# Patient Record
Sex: FEMALE | Race: BLACK OR AFRICAN AMERICAN | NOT HISPANIC OR LATINO | Employment: FULL TIME | URBAN - METROPOLITAN AREA
[De-identification: names, ages, dates, MRNs, and addresses within clinical notes are randomized per-mention and may not be internally consistent; named-entity substitution may affect disease eponyms.]

---

## 2023-03-15 DIAGNOSIS — E78.5 HYPERLIPIDEMIA, UNSPECIFIED HYPERLIPIDEMIA TYPE: ICD-10-CM

## 2023-03-15 RX ORDER — ROSUVASTATIN CALCIUM 40 MG/1
TABLET, COATED ORAL
Qty: 90 TABLET | Refills: 3 | Status: SHIPPED | OUTPATIENT
Start: 2023-03-15 | End: 2023-03-15

## 2023-04-06 PROBLEM — M25.519 PAIN, JOINT, SHOULDER: Status: ACTIVE | Noted: 2023-04-06

## 2023-04-06 PROBLEM — D21.9 GRANULAR CELL TUMOR: Status: ACTIVE | Noted: 2023-04-06

## 2023-04-06 PROBLEM — R51.9 NEW ONSET HEADACHE: Status: ACTIVE | Noted: 2023-04-06

## 2023-04-06 PROBLEM — E66.9 CLASS 1 OBESITY WITH BODY MASS INDEX (BMI) OF 34.0 TO 34.9 IN ADULT: Status: ACTIVE | Noted: 2023-04-06

## 2023-04-06 PROBLEM — H52.203 MYOPIA OF BOTH EYES WITH ASTIGMATISM AND PRESBYOPIA: Status: ACTIVE | Noted: 2023-04-06

## 2023-04-06 PROBLEM — H01.003 BLEPHARITIS OF BOTH EYES: Status: ACTIVE | Noted: 2023-04-06

## 2023-04-06 PROBLEM — N92.4 PERIMENOPAUSAL MENORRHAGIA: Status: ACTIVE | Noted: 2023-04-06

## 2023-04-06 PROBLEM — R01.1 HEART MURMUR: Status: ACTIVE | Noted: 2023-04-06

## 2023-04-06 PROBLEM — N63.0 LUMP OR MASS IN BREAST: Status: ACTIVE | Noted: 2023-04-06

## 2023-04-06 PROBLEM — N95.0 POSTMENOPAUSAL BLEEDING: Status: ACTIVE | Noted: 2023-04-06

## 2023-04-06 PROBLEM — M25.511 RIGHT SHOULDER PAIN: Status: ACTIVE | Noted: 2023-04-06

## 2023-04-06 PROBLEM — A60.00 PRIMARY GENITAL HERPES SIMPLEX INFECTION: Status: ACTIVE | Noted: 2023-04-06

## 2023-04-06 PROBLEM — R91.1 LUNG NODULE: Status: ACTIVE | Noted: 2023-04-06

## 2023-04-06 PROBLEM — N89.8 VAGINAL DISCHARGE: Status: ACTIVE | Noted: 2023-04-06

## 2023-04-06 PROBLEM — I10 HYPERTENSION: Status: ACTIVE | Noted: 2023-04-06

## 2023-04-06 PROBLEM — R07.9 CHEST PAIN: Status: ACTIVE | Noted: 2023-04-06

## 2023-04-06 PROBLEM — E05.90 SUBCLINICAL HYPERTHYROIDISM: Status: ACTIVE | Noted: 2023-04-06

## 2023-04-06 PROBLEM — C73: Status: ACTIVE | Noted: 2023-04-06

## 2023-04-06 PROBLEM — S39.012A LUMBAR STRAIN: Status: ACTIVE | Noted: 2023-04-06

## 2023-04-06 PROBLEM — R51.9 SINUS HEADACHE: Status: ACTIVE | Noted: 2023-04-06

## 2023-04-06 PROBLEM — N63.20 LEFT BREAST LUMP: Status: ACTIVE | Noted: 2023-04-06

## 2023-04-06 PROBLEM — B37.31 VAGINAL YEAST INFECTION: Status: ACTIVE | Noted: 2023-04-06

## 2023-04-06 PROBLEM — E04.2 NONTOXIC MULTINODULAR GOITER: Status: ACTIVE | Noted: 2023-04-06

## 2023-04-06 PROBLEM — I35.1 MILD AORTIC REGURGITATION: Status: ACTIVE | Noted: 2023-04-06

## 2023-04-06 PROBLEM — D49.1 LUNG TUMOR: Status: ACTIVE | Noted: 2023-04-06

## 2023-04-06 PROBLEM — H52.13 MYOPIA OF BOTH EYES WITH ASTIGMATISM AND PRESBYOPIA: Status: ACTIVE | Noted: 2023-04-06

## 2023-04-06 PROBLEM — M17.12 PRIMARY OSTEOARTHRITIS OF LEFT KNEE: Status: ACTIVE | Noted: 2023-04-06

## 2023-04-06 PROBLEM — E66.811 CLASS 1 OBESITY WITH BODY MASS INDEX (BMI) OF 34.0 TO 34.9 IN ADULT: Status: ACTIVE | Noted: 2023-04-06

## 2023-04-06 PROBLEM — C73 PAPILLARY MICROCARCINOMA OF THYROID (MULTI): Status: ACTIVE | Noted: 2023-04-06

## 2023-04-06 PROBLEM — E55.9 VITAMIN D DEFICIENCY: Status: ACTIVE | Noted: 2023-04-06

## 2023-04-06 PROBLEM — N60.19 FIBROCYSTIC BREAST: Status: ACTIVE | Noted: 2023-04-06

## 2023-04-06 PROBLEM — E78.2 HYPERLIPEMIA, MIXED: Status: ACTIVE | Noted: 2023-04-06

## 2023-04-06 PROBLEM — F17.200 NICOTINE DEPENDENCE, UNSPECIFIED, UNCOMPLICATED: Status: ACTIVE | Noted: 2023-04-06

## 2023-04-06 PROBLEM — E03.9 HYPOTHYROIDISM: Status: ACTIVE | Noted: 2023-04-06

## 2023-04-06 PROBLEM — H01.006 BLEPHARITIS OF BOTH EYES: Status: ACTIVE | Noted: 2023-04-06

## 2023-04-06 PROBLEM — H52.4 MYOPIA OF BOTH EYES WITH ASTIGMATISM AND PRESBYOPIA: Status: ACTIVE | Noted: 2023-04-06

## 2023-04-06 PROBLEM — H00.024 HORDEOLUM INTERNUM OF LEFT UPPER EYELID: Status: ACTIVE | Noted: 2023-04-06

## 2023-04-06 PROBLEM — C73 THYROID CANCER (MULTI): Status: ACTIVE | Noted: 2023-04-06

## 2023-04-06 PROBLEM — H00.026 INTERNAL HORDEOLUM OF LEFT EYE: Status: ACTIVE | Noted: 2023-04-06

## 2023-04-06 PROBLEM — L98.9 SKIN LESION: Status: ACTIVE | Noted: 2023-04-06

## 2023-04-06 RX ORDER — VALACYCLOVIR HYDROCHLORIDE 500 MG/1
1 TABLET, FILM COATED ORAL DAILY
COMMUNITY
Start: 2022-07-01 | End: 2023-12-04 | Stop reason: SDUPTHER

## 2023-04-06 RX ORDER — ERGOCALCIFEROL 1.25 MG/1
1 CAPSULE ORAL
COMMUNITY
Start: 2019-02-01 | End: 2024-04-16 | Stop reason: SDUPTHER

## 2023-04-06 RX ORDER — LEVOTHYROXINE SODIUM 112 UG/1
1 TABLET ORAL DAILY
COMMUNITY
Start: 2023-03-15 | End: 2023-10-19 | Stop reason: SDUPTHER

## 2023-04-06 RX ORDER — LISINOPRIL 40 MG/1
1 TABLET ORAL DAILY
COMMUNITY
Start: 2022-04-08 | End: 2023-10-18 | Stop reason: SDUPTHER

## 2023-06-08 ENCOUNTER — PATIENT OUTREACH (OUTPATIENT)
Dept: CARE COORDINATION | Facility: CLINIC | Age: 58
End: 2023-06-08

## 2023-06-08 DIAGNOSIS — I10 HYPERTENSION, UNSPECIFIED TYPE: Primary | ICD-10-CM

## 2023-06-08 RX ORDER — CHLORTHALIDONE 25 MG/1
25 TABLET ORAL DAILY
Qty: 30 TABLET | Refills: 1 | Status: SHIPPED | OUTPATIENT
Start: 2023-06-08 | End: 2023-06-14

## 2023-06-08 NOTE — PROGRESS NOTES
[facility] member [facility] ED outreach.    Spoke with member and introduced myself and purpose of call.  Confirmed : No  Taken to ED for sudden onset headache following bending down to put fix-a-flat into her tire. Returned to the house and called daughter who lives upstairs. Daughter noticed not breathing right and called 911.  BP in /100.CT & Neuro exam within normal limits.  Pt treated with Labetalol for HTN and Toradol, Reglan, Tylenol for HA.  Repeat /83  No new or worsening symptoms, feeling 100% better than yesterday  No new Rx given at discharge. Reviewed meds with patient. Uses reminder on phone for taking.  Can only attribute her elevated BP to the dinner she had the night before. States she has been trying to eat healthier and trying to cut out salt. Does not add salt to food, doesn't cook with it.   Mentioned connecting with [facility], and she expressed interest in speaking with someone from the team.   States she was given contact number for Neurology, but margarito like to wait until she sees her PCP.  -------------------------  Has not been engaged with [facility]. Provided direction to accessing the portal and activities available. Very interested.   No assistance provided, available if needed in the future.

## 2023-06-12 ENCOUNTER — PATIENT OUTREACH (OUTPATIENT)
Dept: CARE COORDINATION | Facility: CLINIC | Age: 58
End: 2023-06-12

## 2023-06-14 ENCOUNTER — PATIENT OUTREACH (OUTPATIENT)
Dept: CARE COORDINATION | Facility: CLINIC | Age: 58
End: 2023-06-14

## 2023-06-14 ENCOUNTER — OFFICE VISIT (OUTPATIENT)
Dept: PRIMARY CARE | Facility: CLINIC | Age: 58
End: 2023-06-14
Payer: COMMERCIAL

## 2023-06-14 VITALS
WEIGHT: 193 LBS | HEART RATE: 72 BPM | BODY MASS INDEX: 33.13 KG/M2 | SYSTOLIC BLOOD PRESSURE: 100 MMHG | DIASTOLIC BLOOD PRESSURE: 66 MMHG

## 2023-06-14 DIAGNOSIS — E78.2 HYPERLIPEMIA, MIXED: ICD-10-CM

## 2023-06-14 DIAGNOSIS — C73 PAPILLARY MICROCARCINOMA OF THYROID (MULTI): ICD-10-CM

## 2023-06-14 DIAGNOSIS — G44.86 CERVICOGENIC HEADACHE: Primary | ICD-10-CM

## 2023-06-14 DIAGNOSIS — C73 THYROID CANCER (MULTI): ICD-10-CM

## 2023-06-14 DIAGNOSIS — E03.9 HYPOTHYROIDISM, UNSPECIFIED TYPE: ICD-10-CM

## 2023-06-14 PROBLEM — R07.9 CHEST PAIN: Status: RESOLVED | Noted: 2023-04-06 | Resolved: 2023-06-14

## 2023-06-14 PROCEDURE — 99214 OFFICE O/P EST MOD 30 MIN: CPT | Performed by: INTERNAL MEDICINE

## 2023-06-14 PROCEDURE — 1036F TOBACCO NON-USER: CPT | Performed by: INTERNAL MEDICINE

## 2023-06-14 PROCEDURE — 3074F SYST BP LT 130 MM HG: CPT | Performed by: INTERNAL MEDICINE

## 2023-06-14 PROCEDURE — 3078F DIAST BP <80 MM HG: CPT | Performed by: INTERNAL MEDICINE

## 2023-06-14 RX ORDER — METHYLPREDNISOLONE 4 MG/1
TABLET ORAL
Qty: 21 TABLET | Refills: 0 | Status: SHIPPED | OUTPATIENT
Start: 2023-06-14 | End: 2023-06-14 | Stop reason: SDUPTHER

## 2023-06-14 RX ORDER — METHYLPREDNISOLONE 4 MG/1
TABLET ORAL
Qty: 21 TABLET | Refills: 0 | Status: SHIPPED | OUTPATIENT
Start: 2023-06-14 | End: 2023-06-21

## 2023-06-14 ASSESSMENT — ENCOUNTER SYMPTOMS: HEADACHES: 1

## 2023-06-14 NOTE — PROGRESS NOTES
Subjective   Patient ID: JESSICA Haq is a 57 y.o. female who presents for No chief complaint on file..    Patient presents for follow-up.  She was seen in the emergency room for headaches.  She had a negative CT scan.  Her blood pressure was elevated.  We added chlorthalidone to her blood pressure regimen.  She is often been taking 2 lisinopril's per day.  She took some sinus medication yesterday with resolution of her headaches.  She describes the headache as pounding headache on the front of her head and radiates to the back of her head and neck.  She denies any further headaches today.  There is no associated nausea vomiting, photophobia..  She denies any chest pain or shortness of breath, no abdominal pain no nausea vomiting or diarrhea.  She has quit smoking.         Review of Systems   Neurological:  Positive for headaches.       Objective   /66   Pulse 72   Wt 87.5 kg (193 lb)   BMI 33.13 kg/m²     Physical Exam  Constitutional:       Appearance: Normal appearance.   Cardiovascular:      Rate and Rhythm: Normal rate and regular rhythm.      Heart sounds: No murmur heard.     No gallop.   Pulmonary:      Effort: No respiratory distress.      Breath sounds: No wheezing or rales.   Abdominal:      General: There is no distension.      Palpations: There is no mass.      Tenderness: There is no abdominal tenderness. There is no guarding.   Musculoskeletal:      Cervical back: Normal range of motion and neck supple. No tenderness.      Right lower leg: No edema.      Left lower leg: No edema.   Neurological:      General: No focal deficit present.      Mental Status: She is alert.         Assessment/Plan   Diagnoses and all orders for this visit:  Cervicogenic headache she will take a Medrol Dosepak if symptoms return.  -     methylPREDNISolone (Medrol Dospak) 4 mg tablets; Take as directed on package.  Papillary microcarcinoma of thyroid (CMS/HCC)  Hyperlipemia, mixed-diet and  exercise  Hypothyroidism, unspecified type-follow-up with endocrinology  Thyroid cancer (CMS/HCC)-follow-up with endocrinology.  Hypertension-DC chlorthalidone.  Monitor blood pressure at home.  Do not take any of your lisinopril.  Health maintenance-mammogram has been done.  Colonoscopy is up-to-date.

## 2023-06-14 NOTE — PATIENT INSTRUCTIONS
Please take medication as prescribed if headache returns..  Call if not better.  DC chlorthalidone and do not take extra lisinopril.  Monitor your blood pressure at home.

## 2023-06-14 NOTE — PROGRESS NOTES
Subjective   Patient ID: JESSICA Haq is a 57 y.o. female who presents for No chief complaint on file..    HPI     Review of Systems    Objective   Wt 87.5 kg (193 lb)   BMI 33.13 kg/m²     Physical Exam    Assessment/Plan

## 2023-06-14 NOTE — CARE PLAN
Problem: Diet Management  Goal: Learn to Manage Mediterranean Diet    Outcome: Progressing   RD consult received for nutrition education related to hypertension. Patient reports recent visit to ED with /102; believes this was related to sinus pressure and BP has been coming down. She has started to make some changes to her diet. Has reduced sodium intake and eliminating pork.   Provided education on Mediterranean Style of eating; Encouraged more real/whole foods with emphasize on more plant based foods.  Provided recipes and meal planning ideas. Discussed eating at home more often and limiting processed and fast foods. Appeared very receptive to information. E-mail sent with support materials. Will follow up in 2 months. LORENZO

## 2023-06-22 ENCOUNTER — APPOINTMENT (OUTPATIENT)
Dept: PRIMARY CARE | Facility: CLINIC | Age: 58
End: 2023-06-22

## 2023-06-28 ENCOUNTER — APPOINTMENT (OUTPATIENT)
Dept: PRIMARY CARE | Facility: CLINIC | Age: 58
End: 2023-06-28

## 2023-08-28 ENCOUNTER — PATIENT OUTREACH (OUTPATIENT)
Dept: CARE COORDINATION | Facility: CLINIC | Age: 58
End: 2023-08-28

## 2023-08-28 NOTE — CARE PLAN
Problem: Diet Management  Goal: Learn to Manage Mediterranean Diet    Outcome: Progressing   RD follow up call; patient reports that she is doing very well. Has made some significant changes to her diet and blood pressure is under good control. She is planning on discussing going off her blood pressure medicine with her doctor. She is checking it every day and feels her numbers are great.   Weight has decreased ~5 lbs since making dietary changes. Watching sodium, cooking more, and is eating lots of salads. Will follow up in a few months for ongoing support. LORENZO

## 2023-09-27 ENCOUNTER — TELEPHONE (OUTPATIENT)
Dept: OTOLARYNGOLOGY | Facility: HOSPITAL | Age: 58
End: 2023-09-27

## 2023-09-28 LAB
CHLAMYDIA TRACH., AMPLIFIED: NEGATIVE
CLUE CELLS: ABNORMAL
N. GONORRHEA, AMPLIFIED: NEGATIVE
NUGENT SCORE: 0
TRICHOMONAS VAGINALIS: NEGATIVE
YEAST: PRESENT

## 2023-09-30 ENCOUNTER — PHARMACY VISIT (OUTPATIENT)
Dept: PHARMACY | Facility: CLINIC | Age: 58
End: 2023-09-30
Payer: COMMERCIAL

## 2023-09-30 PROCEDURE — RXMED WILLOW AMBULATORY MEDICATION CHARGE

## 2023-10-03 ENCOUNTER — TELEPHONE (OUTPATIENT)
Dept: OTOLARYNGOLOGY | Facility: HOSPITAL | Age: 58
End: 2023-10-03

## 2023-10-18 ENCOUNTER — PHARMACY VISIT (OUTPATIENT)
Dept: PHARMACY | Facility: CLINIC | Age: 58
End: 2023-10-18
Payer: COMMERCIAL

## 2023-10-18 DIAGNOSIS — I10 HYPERTENSION, UNSPECIFIED TYPE: Primary | ICD-10-CM

## 2023-10-18 PROCEDURE — RXMED WILLOW AMBULATORY MEDICATION CHARGE

## 2023-10-18 RX ORDER — LISINOPRIL 40 MG/1
40 TABLET ORAL DAILY
Qty: 90 TABLET | Refills: 1 | Status: SHIPPED | OUTPATIENT
Start: 2023-10-18 | End: 2024-04-11 | Stop reason: SDUPTHER

## 2023-10-19 ENCOUNTER — PHARMACY VISIT (OUTPATIENT)
Dept: PHARMACY | Facility: CLINIC | Age: 58
End: 2023-10-19
Payer: COMMERCIAL

## 2023-10-19 DIAGNOSIS — E03.9 HYPOTHYROIDISM, UNSPECIFIED TYPE: Primary | ICD-10-CM

## 2023-10-19 PROCEDURE — RXMED WILLOW AMBULATORY MEDICATION CHARGE

## 2023-10-19 RX ORDER — LEVOTHYROXINE SODIUM 112 UG/1
112 TABLET ORAL
Qty: 90 TABLET | Refills: 0 | Status: SHIPPED | OUTPATIENT
Start: 2023-10-19 | End: 2023-12-07 | Stop reason: SDUPTHER

## 2023-10-25 ENCOUNTER — TELEPHONE (OUTPATIENT)
Dept: OTOLARYNGOLOGY | Facility: HOSPITAL | Age: 58
End: 2023-10-25

## 2023-10-27 ENCOUNTER — APPOINTMENT (OUTPATIENT)
Dept: OBSTETRICS AND GYNECOLOGY | Facility: HOSPITAL | Age: 58
End: 2023-10-27
Payer: COMMERCIAL

## 2023-11-27 ENCOUNTER — TELEPHONE (OUTPATIENT)
Dept: PRIMARY CARE | Facility: CLINIC | Age: 58
End: 2023-11-27

## 2023-12-04 ENCOUNTER — TELEMEDICINE (OUTPATIENT)
Dept: ENDOCRINOLOGY | Facility: CLINIC | Age: 58
End: 2023-12-04
Payer: COMMERCIAL

## 2023-12-04 VITALS
WEIGHT: 194 LBS | BODY MASS INDEX: 33.12 KG/M2 | DIASTOLIC BLOOD PRESSURE: 80 MMHG | SYSTOLIC BLOOD PRESSURE: 117 MMHG | HEIGHT: 64 IN

## 2023-12-04 DIAGNOSIS — E03.9 HYPOTHYROIDISM, UNSPECIFIED TYPE: Primary | ICD-10-CM

## 2023-12-04 PROCEDURE — 99213 OFFICE O/P EST LOW 20 MIN: CPT | Performed by: INTERNAL MEDICINE

## 2023-12-04 ASSESSMENT — ENCOUNTER SYMPTOMS
VOMITING: 0
FATIGUE: 0
HEADACHES: 0
FEVER: 0
CHILLS: 0
PALPITATIONS: 0
SHORTNESS OF BREATH: 0
DIARRHEA: 0
COUGH: 0
NAUSEA: 0

## 2023-12-04 NOTE — PROGRESS NOTES
"Subjective   Patient ID: Vinh Haq \"VINH URIARTE" is a 58 y.o. female who presents for Hypothyroidism.  HPI  Since last visit doing well.   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    Review of Systems   Constitutional:  Negative for chills, fatigue and fever.   Respiratory:  Negative for cough and shortness of breath.    Cardiovascular:  Negative for chest pain and palpitations.   Gastrointestinal:  Negative for diarrhea, nausea and vomiting.   Neurological:  Negative for headaches.       Objective       12/4/2023 1:12 PM    /80   Weight 88 kg (194 lb)   Height 1.626 m (5' 4\")       Physical Exam  virtual    Assessment/Plan   Problem List Items Addressed This Visit             ICD-10-CM    Hypothyroidism - Primary E03.9    Relevant Orders    TSH with reflex to Free T4 if abnormal     Discussed course. Due for labs.  Follow up in one year, encouraged her to call with concerns     "

## 2023-12-07 ENCOUNTER — PHARMACY VISIT (OUTPATIENT)
Dept: PHARMACY | Facility: CLINIC | Age: 58
End: 2023-12-07
Payer: COMMERCIAL

## 2023-12-07 ENCOUNTER — LAB (OUTPATIENT)
Dept: LAB | Facility: LAB | Age: 58
End: 2023-12-07
Payer: COMMERCIAL

## 2023-12-07 DIAGNOSIS — E03.9 HYPOTHYROIDISM, UNSPECIFIED TYPE: ICD-10-CM

## 2023-12-07 LAB
T4 FREE SERPL-MCNC: 1.06 NG/DL (ref 0.78–1.48)
TSH SERPL-ACNC: 5.09 MIU/L (ref 0.44–3.98)

## 2023-12-07 PROCEDURE — 84439 ASSAY OF FREE THYROXINE: CPT

## 2023-12-07 PROCEDURE — 36415 COLL VENOUS BLD VENIPUNCTURE: CPT

## 2023-12-07 PROCEDURE — 84443 ASSAY THYROID STIM HORMONE: CPT

## 2023-12-07 PROCEDURE — RXMED WILLOW AMBULATORY MEDICATION CHARGE

## 2023-12-07 RX ORDER — LEVOTHYROXINE SODIUM 137 UG/1
137 TABLET ORAL
Qty: 90 TABLET | Refills: 3 | Status: SHIPPED | OUTPATIENT
Start: 2023-12-07 | End: 2024-12-06

## 2023-12-12 ENCOUNTER — PATIENT OUTREACH (OUTPATIENT)
Dept: CARE COORDINATION | Facility: CLINIC | Age: 58
End: 2023-12-12

## 2023-12-12 NOTE — CARE PLAN
Problem: Diet Management  Goal: Learn to Manage Mediterranean Diet    Outcome: Met  Intervention: Provide resources for Mediterranean diet  Intervention: Provide resources on counting calories   RD follow up call; patient reports that she is doing well. Has made multiple dietary changes and BP is well controlled at this time. Denies any additional questions or concerns. RD available if future questions arise.

## 2023-12-20 ENCOUNTER — OFFICE VISIT (OUTPATIENT)
Dept: OPHTHALMOLOGY | Facility: CLINIC | Age: 58
End: 2023-12-20
Payer: COMMERCIAL

## 2023-12-20 DIAGNOSIS — H25.13 NUCLEAR SCLEROTIC CATARACT OF BOTH EYES: ICD-10-CM

## 2023-12-20 DIAGNOSIS — H52.4 PRESBYOPIA: ICD-10-CM

## 2023-12-20 DIAGNOSIS — H52.13 MYOPIA OF BOTH EYES: Primary | ICD-10-CM

## 2023-12-20 DIAGNOSIS — H52.223 REGULAR ASTIGMATISM OF BOTH EYES: ICD-10-CM

## 2023-12-20 PROCEDURE — 92004 COMPRE OPH EXAM NEW PT 1/>: CPT | Performed by: OPTOMETRIST

## 2023-12-20 PROCEDURE — 92015 DETERMINE REFRACTIVE STATE: CPT | Performed by: OPTOMETRIST

## 2023-12-20 PROCEDURE — FLVLF CONTACT LENS EVALUATION (SP): Performed by: OPTOMETRIST

## 2023-12-20 ASSESSMENT — TONOMETRY
OS_IOP_MMHG: 15
IOP_METHOD: GOLDMANN APPLANATION
OD_IOP_MMHG: 15

## 2023-12-20 ASSESSMENT — REFRACTION_MANIFEST
OS_AXIS: 155
OS_CYLINDER: -0.75
OS_ADD: +2.50
OS_CYLINDER: -0.50
OS_AXIS: 155
METHOD_AUTOREFRACTION: 1
OD_SPHERE: -6.00
OS_SPHERE: -6.75
OD_SPHERE: -5.50
OD_AXIS: 070
OD_AXIS: 070
OD_CYLINDER: -1.25
OD_CYLINDER: -1.25
OD_ADD: +2.50
OS_SPHERE: -7.25

## 2023-12-20 ASSESSMENT — CUP TO DISC RATIO
OD_RATIO: 0.3
OS_RATIO: 0.2

## 2023-12-20 ASSESSMENT — EXTERNAL EXAM - LEFT EYE: OS_EXAM: NORMAL

## 2023-12-20 ASSESSMENT — ENCOUNTER SYMPTOMS
CONSTITUTIONAL NEGATIVE: 0
RESPIRATORY NEGATIVE: 0
ALLERGIC/IMMUNOLOGIC NEGATIVE: 0
NEUROLOGICAL NEGATIVE: 0
GASTROINTESTINAL NEGATIVE: 0
EYES NEGATIVE: 0
CARDIOVASCULAR NEGATIVE: 0
HEMATOLOGIC/LYMPHATIC NEGATIVE: 0
ENDOCRINE NEGATIVE: 0
MUSCULOSKELETAL NEGATIVE: 0
PSYCHIATRIC NEGATIVE: 0

## 2023-12-20 ASSESSMENT — REFRACTION_WEARINGRX
OD_ADD: +1.25
OD_SPHERE: -5.50
OS_AXIS: 025
OD_AXIS: 080
OD_CYLINDER: -1.00
OS_CYLINDER: -0.50
OS_SPHERE: -7.50
OS_ADD: +1.25

## 2023-12-20 ASSESSMENT — EXTERNAL EXAM - RIGHT EYE: OD_EXAM: NORMAL

## 2023-12-20 ASSESSMENT — VISUAL ACUITY
METHOD: SNELLEN - LINEAR
OD_BAT_LOW: 20/40
OS_BAT_LOW: 20/30
OS_BAT_HIGH: 20/30
OD_BAT_MED: 20/40
OD_CC: 20/50
OS_BAT_MED: 20/30
OS_CC: 20/40
OD_PH_CC+: -2
OD_PH_CC: 20/40
OD_BAT_HIGH: 20/50
CORRECTION_TYPE: GLASSES

## 2023-12-20 ASSESSMENT — REFRACTION_CURRENTRX
OD_ADD: HI
OS_BRAND: ACUVUE 1 DAY MOIST MULTIFOCAL
OS_BASECURVE: 8.4
OD_DIAMETER: 14.3
OD_ADDL_SPECS: OD DOMINANT
OS_ADD: HI
OD_SPHERE: -6.50
OS_SPHERE: -7.00
OD_BRAND: ACUVUE 1 DAY MOIST MULTIFOCAL
OD_BASECURVE: 8.4
OS_DIAMETER: 14.3

## 2023-12-20 ASSESSMENT — CONF VISUAL FIELD
OS_INFERIOR_NASAL_RESTRICTION: 0
OD_INFERIOR_NASAL_RESTRICTION: 0
OD_SUPERIOR_NASAL_RESTRICTION: 0
OS_SUPERIOR_NASAL_RESTRICTION: 0
OS_SUPERIOR_TEMPORAL_RESTRICTION: 0
OS_NORMAL: 1
OS_INFERIOR_TEMPORAL_RESTRICTION: 0
OD_SUPERIOR_TEMPORAL_RESTRICTION: 0
OD_INFERIOR_TEMPORAL_RESTRICTION: 0
OD_NORMAL: 1

## 2023-12-20 ASSESSMENT — REFRACTION
OS_CYLINDER: -0.50
OD_AXIS: 066
OS_ADD: +2.50
OS_SPHERE: -7.25
OD_CYLINDER: -1.50
OD_SPHERE: -6.25
OD_ADD: +2.50
OS_AXIS: 002

## 2023-12-20 ASSESSMENT — SLIT LAMP EXAM - LIDS
COMMENTS: NORMAL
COMMENTS: NORMAL

## 2023-12-20 NOTE — PROGRESS NOTES
Assessment/Plan   Diagnoses and all orders for this visit:  Myopia of both eyes  Regular astigmatism of both eyes  Presbyopia  New spec rx released today per patient request. Ocular health wnl for age OU. Monitor 1 year or sooner prn. Refraction billed today.  Discussed proper wear, care, replacement of contact lenses. Gave handout. D/c cl wear and RTC if eyes become red, painful, irritated. Monitor 1 year.   CL eval billed today. $35. Switch to DD MFCL as patient is infrequent wearer. Discussed expectations. OD dominant.  Nuclear sclerotic cataract of both eyes  Patient's cataracts are not visually significant. Will monitor for changes. No indication for surgery at this time.

## 2023-12-20 NOTE — Clinical Note
Both eyes (OU) Contact Lens Order  Quantity: 2 Package: TRIAL Appointment needed? No Medically necessary? No Ship To: Home Additional instructions: Please mail 2 trial packs per eye of new MF to pt. We switched brands to a daily CL. Thank you!

## 2024-01-11 DIAGNOSIS — Z00.00 HEALTHCARE MAINTENANCE: Primary | ICD-10-CM

## 2024-01-11 PROCEDURE — RXMED WILLOW AMBULATORY MEDICATION CHARGE

## 2024-01-11 RX ORDER — VALACYCLOVIR HYDROCHLORIDE 500 MG/1
500 TABLET, FILM COATED ORAL DAILY
Qty: 90 TABLET | Refills: 0 | Status: SHIPPED | OUTPATIENT
Start: 2024-01-11 | End: 2024-05-28 | Stop reason: SDUPTHER

## 2024-01-17 ENCOUNTER — PHARMACY VISIT (OUTPATIENT)
Dept: PHARMACY | Facility: CLINIC | Age: 59
End: 2024-01-17
Payer: COMMERCIAL

## 2024-02-12 ENCOUNTER — LAB (OUTPATIENT)
Dept: LAB | Facility: LAB | Age: 59
End: 2024-02-12
Payer: COMMERCIAL

## 2024-02-12 DIAGNOSIS — E03.9 HYPOTHYROIDISM, UNSPECIFIED TYPE: ICD-10-CM

## 2024-02-12 LAB
T4 FREE SERPL-MCNC: 1.52 NG/DL (ref 0.78–1.48)
TSH SERPL-ACNC: 0.19 MIU/L (ref 0.44–3.98)

## 2024-02-12 PROCEDURE — 84439 ASSAY OF FREE THYROXINE: CPT

## 2024-02-12 PROCEDURE — 36415 COLL VENOUS BLD VENIPUNCTURE: CPT

## 2024-02-12 PROCEDURE — 84443 ASSAY THYROID STIM HORMONE: CPT

## 2024-02-19 PROCEDURE — RXMED WILLOW AMBULATORY MEDICATION CHARGE

## 2024-02-21 ENCOUNTER — PHARMACY VISIT (OUTPATIENT)
Dept: PHARMACY | Facility: CLINIC | Age: 59
End: 2024-02-21
Payer: COMMERCIAL

## 2024-03-11 ENCOUNTER — OFFICE VISIT (OUTPATIENT)
Dept: PRIMARY CARE | Facility: CLINIC | Age: 59
End: 2024-03-11
Payer: COMMERCIAL

## 2024-03-11 VITALS
TEMPERATURE: 96.7 F | WEIGHT: 184 LBS | DIASTOLIC BLOOD PRESSURE: 76 MMHG | SYSTOLIC BLOOD PRESSURE: 134 MMHG | BODY MASS INDEX: 31.58 KG/M2 | HEART RATE: 72 BPM

## 2024-03-11 DIAGNOSIS — E03.9 HYPOTHYROIDISM, UNSPECIFIED TYPE: ICD-10-CM

## 2024-03-11 DIAGNOSIS — C73 THYROID CANCER (MULTI): ICD-10-CM

## 2024-03-11 DIAGNOSIS — Z00.00 HEALTH CARE MAINTENANCE: ICD-10-CM

## 2024-03-11 DIAGNOSIS — E66.09 CLASS 1 OBESITY DUE TO EXCESS CALORIES WITHOUT SERIOUS COMORBIDITY WITH BODY MASS INDEX (BMI) OF 31.0 TO 31.9 IN ADULT: ICD-10-CM

## 2024-03-11 DIAGNOSIS — C73: ICD-10-CM

## 2024-03-11 DIAGNOSIS — E78.2 HYPERLIPEMIA, MIXED: ICD-10-CM

## 2024-03-11 DIAGNOSIS — D49.1 LUNG TUMOR: ICD-10-CM

## 2024-03-11 DIAGNOSIS — Z12.11 SCREENING FOR COLON CANCER: Primary | ICD-10-CM

## 2024-03-11 DIAGNOSIS — I10 HYPERTENSION, UNSPECIFIED TYPE: ICD-10-CM

## 2024-03-11 PROCEDURE — 1036F TOBACCO NON-USER: CPT | Performed by: INTERNAL MEDICINE

## 2024-03-11 PROCEDURE — 99214 OFFICE O/P EST MOD 30 MIN: CPT | Performed by: INTERNAL MEDICINE

## 2024-03-11 PROCEDURE — 3078F DIAST BP <80 MM HG: CPT | Performed by: INTERNAL MEDICINE

## 2024-03-11 PROCEDURE — 3075F SYST BP GE 130 - 139MM HG: CPT | Performed by: INTERNAL MEDICINE

## 2024-03-11 PROCEDURE — 3008F BODY MASS INDEX DOCD: CPT | Performed by: INTERNAL MEDICINE

## 2024-03-11 ASSESSMENT — ENCOUNTER SYMPTOMS
PHOTOPHOBIA: 0
BACK PAIN: 0
HEADACHES: 0
FLANK PAIN: 0
SPEECH DIFFICULTY: 0
FATIGUE: 0
COUGH: 0
RHINORRHEA: 0
CHILLS: 0
NUMBNESS: 0
JOINT SWELLING: 0
ADENOPATHY: 0
CHOKING: 0
SEIZURES: 0
TROUBLE SWALLOWING: 0
CHEST TIGHTNESS: 0
WEAKNESS: 0
WOUND: 0
SLEEP DISTURBANCE: 0
CONSTIPATION: 0
BLOOD IN STOOL: 0
EYE PAIN: 0
APPETITE CHANGE: 0
HEMATURIA: 0
RECTAL PAIN: 0
DIARRHEA: 0
TREMORS: 0
ABDOMINAL PAIN: 0
DIAPHORESIS: 0
ACTIVITY CHANGE: 0
NAUSEA: 0
PALPITATIONS: 0
NECK PAIN: 0
EYE DISCHARGE: 0
POLYPHAGIA: 0
MYALGIAS: 0
BRUISES/BLEEDS EASILY: 0
SORE THROAT: 0
EYE ITCHING: 0
SINUS PAIN: 0
EYE REDNESS: 0
DIZZINESS: 0
SHORTNESS OF BREATH: 0
POLYDIPSIA: 0
FACIAL SWELLING: 0
ANAL BLEEDING: 0
FACIAL ASYMMETRY: 0
STRIDOR: 0
ABDOMINAL DISTENTION: 0
SINUS PRESSURE: 0
WHEEZING: 0
ARTHRALGIAS: 0
COLOR CHANGE: 0
FREQUENCY: 0
VOMITING: 0
LIGHT-HEADEDNESS: 0
DYSURIA: 0
NECK STIFFNESS: 0
VOICE CHANGE: 0
DIFFICULTY URINATING: 0

## 2024-03-11 NOTE — PROGRESS NOTES
Subjective   Patient ID: JESSICA Haq is a 58 y.o. female who presents for Follow-up (Pt present today for 6 month follow up. ls).    Patient presents for follow-up.  She has been compliant with her medications, diet and exercise.  She overall feels well.  She denies any headaches, no dizziness, no chest pain or shortness of breath.  She denies abdominal pain no nausea vomiting or diarrhea.  She reports no new musculoskeletal complaints..  She has cut way back on smoking.         Review of Systems   Constitutional:  Negative for activity change, appetite change, chills, diaphoresis and fatigue.   HENT:  Negative for congestion, dental problem, drooling, ear discharge, ear pain, facial swelling, hearing loss, mouth sores, nosebleeds, postnasal drip, rhinorrhea, sinus pressure, sinus pain, sneezing, sore throat, tinnitus, trouble swallowing and voice change.    Eyes:  Negative for photophobia, pain, discharge, redness, itching and visual disturbance.   Respiratory:  Negative for cough, choking, chest tightness, shortness of breath, wheezing and stridor.    Cardiovascular:  Negative for chest pain, palpitations and leg swelling.   Gastrointestinal:  Negative for abdominal distention, abdominal pain, anal bleeding, blood in stool, constipation, diarrhea, nausea, rectal pain and vomiting.   Endocrine: Negative for cold intolerance, heat intolerance, polydipsia, polyphagia and polyuria.   Genitourinary:  Negative for decreased urine volume, difficulty urinating, dysuria, enuresis, flank pain, frequency, genital sores, hematuria and urgency.   Musculoskeletal:  Negative for arthralgias, back pain, gait problem, joint swelling, myalgias, neck pain and neck stiffness.   Skin:  Negative for color change, pallor, rash and wound.   Neurological:  Negative for dizziness, tremors, seizures, syncope, facial asymmetry, speech difficulty, weakness, light-headedness, numbness and headaches.   Hematological:  Negative for  adenopathy. Does not bruise/bleed easily.   Psychiatric/Behavioral:  Negative for sleep disturbance.        Objective   /76   Pulse 72   Temp 35.9 °C (96.7 °F)   Wt 83.5 kg (184 lb)   BMI 31.58 kg/m²     Physical Exam  Constitutional:       Appearance: Normal appearance.   Cardiovascular:      Rate and Rhythm: Normal rate and regular rhythm.      Heart sounds: No murmur heard.     No gallop.   Pulmonary:      Effort: No respiratory distress.      Breath sounds: No wheezing or rales.   Abdominal:      General: There is no distension.      Palpations: There is no mass.      Tenderness: There is no abdominal tenderness. There is no guarding.   Musculoskeletal:      Right lower leg: No edema.      Left lower leg: No edema.   Neurological:      Mental Status: She is alert.         Assessment/Plan   Diagnoses and all orders for this visit:  Screening for colon cancer  -     Colonoscopy Screening; Average Risk Patient; Future  Thyroid cancer (CMS/HCC)  Health care maintenance-she will schedule mammogram and colonoscopy.  Refuses all immunizations.  Eye and appointment has been done.  She will schedule a dental appointment  -     CBC and Auto Differential; Future  -     Vitamin D 25-Hydroxy,Total (for eval of Vitamin D levels); Future  -     Uric Acid; Future  -     Urinalysis with Reflex Microscopic; Future  -     Albumin , Urine Random; Future  -     Comprehensive Metabolic Panel; Future  -     Lipid Panel; Future  Hyperlipemia, mixed-check a fasting lipid profile  Hypertension, unspecified type-low-salt diet and exercise-  Hypothyroidism, unspecified type will recheck TSH.  Follow-up with endocrine  Carcinoma of thyroid (CMS/HCC)-follow-up with endocrinology.  Lung tumor-follow-up with pulmonary  Tobacco abuse-she is cut way back.  Total cessation strongly encouraged.  Refuses medications.  CT scan has been done  Obesity-she will diet and exercise

## 2024-04-04 ENCOUNTER — HOSPITAL ENCOUNTER (OUTPATIENT)
Dept: RADIOLOGY | Facility: HOSPITAL | Age: 59
Discharge: HOME | End: 2024-04-04
Payer: COMMERCIAL

## 2024-04-04 VITALS — HEIGHT: 61 IN | BODY MASS INDEX: 34.74 KG/M2 | WEIGHT: 184 LBS

## 2024-04-04 DIAGNOSIS — Z12.31 ENCOUNTER FOR SCREENING MAMMOGRAM FOR MALIGNANT NEOPLASM OF BREAST: ICD-10-CM

## 2024-04-04 PROCEDURE — 77067 SCR MAMMO BI INCL CAD: CPT

## 2024-04-04 PROCEDURE — 77063 BREAST TOMOSYNTHESIS BI: CPT | Performed by: RADIOLOGY

## 2024-04-04 PROCEDURE — 77067 SCR MAMMO BI INCL CAD: CPT | Performed by: RADIOLOGY

## 2024-04-11 ENCOUNTER — LAB (OUTPATIENT)
Dept: LAB | Facility: LAB | Age: 59
End: 2024-04-11

## 2024-04-11 DIAGNOSIS — I10 HYPERTENSION, UNSPECIFIED TYPE: ICD-10-CM

## 2024-04-12 PROCEDURE — RXMED WILLOW AMBULATORY MEDICATION CHARGE

## 2024-04-12 RX ORDER — LISINOPRIL 40 MG/1
40 TABLET ORAL DAILY
Qty: 90 TABLET | Refills: 1 | Status: SHIPPED | OUTPATIENT
Start: 2024-04-12

## 2024-04-15 ENCOUNTER — APPOINTMENT (OUTPATIENT)
Dept: PULMONOLOGY | Facility: CLINIC | Age: 59
End: 2024-04-15
Payer: COMMERCIAL

## 2024-04-16 ENCOUNTER — LAB (OUTPATIENT)
Dept: LAB | Facility: LAB | Age: 59
End: 2024-04-16
Payer: COMMERCIAL

## 2024-04-16 ENCOUNTER — PHARMACY VISIT (OUTPATIENT)
Dept: PHARMACY | Facility: CLINIC | Age: 59
End: 2024-04-16
Payer: COMMERCIAL

## 2024-04-16 DIAGNOSIS — E55.9 VITAMIN D DEFICIENCY: Primary | ICD-10-CM

## 2024-04-16 DIAGNOSIS — E78.5 HYPERLIPIDEMIA, UNSPECIFIED HYPERLIPIDEMIA TYPE: ICD-10-CM

## 2024-04-16 DIAGNOSIS — Z00.00 HEALTH CARE MAINTENANCE: ICD-10-CM

## 2024-04-16 LAB
25(OH)D3 SERPL-MCNC: 16 NG/ML (ref 30–100)
ALBUMIN SERPL BCP-MCNC: 4.3 G/DL (ref 3.4–5)
ALP SERPL-CCNC: 69 U/L (ref 33–136)
ALT SERPL W P-5'-P-CCNC: 22 U/L (ref 7–45)
ANION GAP SERPL CALC-SCNC: 15 MMOL/L (ref 10–20)
APPEARANCE UR: CLEAR
AST SERPL W P-5'-P-CCNC: 11 U/L (ref 9–39)
BASOPHILS # BLD AUTO: 0.04 X10*3/UL (ref 0–0.1)
BASOPHILS NFR BLD AUTO: 0.5 %
BILIRUB SERPL-MCNC: 0.3 MG/DL (ref 0–1.2)
BILIRUB UR STRIP.AUTO-MCNC: NEGATIVE MG/DL
BUN SERPL-MCNC: 19 MG/DL (ref 6–23)
CALCIUM SERPL-MCNC: 10.2 MG/DL (ref 8.6–10.6)
CHLORIDE SERPL-SCNC: 105 MMOL/L (ref 98–107)
CHOLEST SERPL-MCNC: 326 MG/DL (ref 0–199)
CHOLESTEROL/HDL RATIO: 7.7
CO2 SERPL-SCNC: 24 MMOL/L (ref 21–32)
COLOR UR: NORMAL
CREAT SERPL-MCNC: 0.84 MG/DL (ref 0.5–1.05)
CREAT UR-MCNC: 121.7 MG/DL (ref 20–320)
EGFRCR SERPLBLD CKD-EPI 2021: 80 ML/MIN/1.73M*2
EOSINOPHIL # BLD AUTO: 0.15 X10*3/UL (ref 0–0.7)
EOSINOPHIL NFR BLD AUTO: 1.8 %
ERYTHROCYTE [DISTWIDTH] IN BLOOD BY AUTOMATED COUNT: 14.9 % (ref 11.5–14.5)
GLUCOSE SERPL-MCNC: 102 MG/DL (ref 74–99)
GLUCOSE UR STRIP.AUTO-MCNC: NORMAL MG/DL
HCT VFR BLD AUTO: 41.8 % (ref 36–46)
HDLC SERPL-MCNC: 42.5 MG/DL
HGB BLD-MCNC: 13.5 G/DL (ref 12–16)
IMM GRANULOCYTES # BLD AUTO: 0.02 X10*3/UL (ref 0–0.7)
IMM GRANULOCYTES NFR BLD AUTO: 0.2 % (ref 0–0.9)
KETONES UR STRIP.AUTO-MCNC: NEGATIVE MG/DL
LDLC SERPL CALC-MCNC: 242 MG/DL
LEUKOCYTE ESTERASE UR QL STRIP.AUTO: NEGATIVE
LYMPHOCYTES # BLD AUTO: 4.04 X10*3/UL (ref 1.2–4.8)
LYMPHOCYTES NFR BLD AUTO: 48 %
MCH RBC QN AUTO: 28.4 PG (ref 26–34)
MCHC RBC AUTO-ENTMCNC: 32.3 G/DL (ref 32–36)
MCV RBC AUTO: 88 FL (ref 80–100)
MICROALBUMIN UR-MCNC: 12.9 MG/L
MICROALBUMIN/CREAT UR: 10.6 UG/MG CREAT
MONOCYTES # BLD AUTO: 0.42 X10*3/UL (ref 0.1–1)
MONOCYTES NFR BLD AUTO: 5 %
NEUTROPHILS # BLD AUTO: 3.74 X10*3/UL (ref 1.2–7.7)
NEUTROPHILS NFR BLD AUTO: 44.5 %
NITRITE UR QL STRIP.AUTO: NEGATIVE
NON HDL CHOLESTEROL: 284 MG/DL (ref 0–149)
NRBC BLD-RTO: 0 /100 WBCS (ref 0–0)
PH UR STRIP.AUTO: 5 [PH]
PLATELET # BLD AUTO: 424 X10*3/UL (ref 150–450)
POTASSIUM SERPL-SCNC: 4.5 MMOL/L (ref 3.5–5.3)
PROT SERPL-MCNC: 7.2 G/DL (ref 6.4–8.2)
PROT UR STRIP.AUTO-MCNC: NEGATIVE MG/DL
RBC # BLD AUTO: 4.75 X10*6/UL (ref 4–5.2)
RBC # UR STRIP.AUTO: NEGATIVE /UL
SODIUM SERPL-SCNC: 139 MMOL/L (ref 136–145)
SP GR UR STRIP.AUTO: 1.02
TRIGL SERPL-MCNC: 206 MG/DL (ref 0–149)
URATE SERPL-MCNC: 7.9 MG/DL (ref 2.3–6.7)
UROBILINOGEN UR STRIP.AUTO-MCNC: NORMAL MG/DL
VLDL: 41 MG/DL (ref 0–40)
WBC # BLD AUTO: 8.4 X10*3/UL (ref 4.4–11.3)

## 2024-04-16 PROCEDURE — 81003 URINALYSIS AUTO W/O SCOPE: CPT

## 2024-04-16 PROCEDURE — 82306 VITAMIN D 25 HYDROXY: CPT

## 2024-04-16 PROCEDURE — 80061 LIPID PANEL: CPT

## 2024-04-16 PROCEDURE — 80053 COMPREHEN METABOLIC PANEL: CPT

## 2024-04-16 PROCEDURE — 82043 UR ALBUMIN QUANTITATIVE: CPT

## 2024-04-16 PROCEDURE — RXMED WILLOW AMBULATORY MEDICATION CHARGE

## 2024-04-16 PROCEDURE — 85025 COMPLETE CBC W/AUTO DIFF WBC: CPT

## 2024-04-16 PROCEDURE — 82570 ASSAY OF URINE CREATININE: CPT

## 2024-04-16 PROCEDURE — 84550 ASSAY OF BLOOD/URIC ACID: CPT

## 2024-04-16 PROCEDURE — 36415 COLL VENOUS BLD VENIPUNCTURE: CPT

## 2024-04-16 RX ORDER — ERGOCALCIFEROL 1.25 MG/1
50000 CAPSULE ORAL
Qty: 12 CAPSULE | Refills: 0 | Status: SHIPPED | OUTPATIENT
Start: 2024-04-21 | End: 2024-05-28 | Stop reason: SDUPTHER

## 2024-04-16 RX ORDER — ROSUVASTATIN CALCIUM 40 MG/1
40 TABLET, COATED ORAL DAILY
Qty: 90 TABLET | Refills: 3 | Status: SHIPPED | OUTPATIENT
Start: 2024-04-16 | End: 2025-04-16

## 2024-04-18 ENCOUNTER — TELEPHONE (OUTPATIENT)
Dept: ENDOCRINOLOGY | Facility: CLINIC | Age: 59
End: 2024-04-18

## 2024-04-18 ENCOUNTER — OFFICE VISIT (OUTPATIENT)
Dept: OPHTHALMOLOGY | Facility: CLINIC | Age: 59
End: 2024-04-18
Payer: COMMERCIAL

## 2024-04-18 ENCOUNTER — LAB (OUTPATIENT)
Dept: LAB | Facility: LAB | Age: 59
End: 2024-04-18
Payer: COMMERCIAL

## 2024-04-18 DIAGNOSIS — H16.142 SUPERFICIAL PUNCTATE KERATITIS OF LEFT EYE: Primary | ICD-10-CM

## 2024-04-18 DIAGNOSIS — E03.9 HYPOTHYROIDISM, UNSPECIFIED TYPE: Primary | ICD-10-CM

## 2024-04-18 DIAGNOSIS — E78.5 HYPERLIPIDEMIA, UNSPECIFIED HYPERLIPIDEMIA TYPE: ICD-10-CM

## 2024-04-18 DIAGNOSIS — E03.9 HYPOTHYROIDISM, UNSPECIFIED TYPE: ICD-10-CM

## 2024-04-18 LAB
ALT SERPL W P-5'-P-CCNC: 22 U/L (ref 7–45)
AST SERPL W P-5'-P-CCNC: 12 U/L (ref 9–39)
TSH SERPL-ACNC: 2.35 MIU/L (ref 0.44–3.98)

## 2024-04-18 PROCEDURE — 84443 ASSAY THYROID STIM HORMONE: CPT

## 2024-04-18 PROCEDURE — 36415 COLL VENOUS BLD VENIPUNCTURE: CPT

## 2024-04-18 PROCEDURE — 84450 TRANSFERASE (AST) (SGOT): CPT

## 2024-04-18 PROCEDURE — 84460 ALANINE AMINO (ALT) (SGPT): CPT

## 2024-04-18 PROCEDURE — 99213 OFFICE O/P EST LOW 20 MIN: CPT | Performed by: STUDENT IN AN ORGANIZED HEALTH CARE EDUCATION/TRAINING PROGRAM

## 2024-04-18 ASSESSMENT — ENCOUNTER SYMPTOMS
GASTROINTESTINAL NEGATIVE: 0
CONSTITUTIONAL NEGATIVE: 0
EYES NEGATIVE: 0
MUSCULOSKELETAL NEGATIVE: 0
HEMATOLOGIC/LYMPHATIC NEGATIVE: 0
RESPIRATORY NEGATIVE: 0
NEUROLOGICAL NEGATIVE: 0
PSYCHIATRIC NEGATIVE: 0
ENDOCRINE NEGATIVE: 0
ALLERGIC/IMMUNOLOGIC NEGATIVE: 0
CARDIOVASCULAR NEGATIVE: 0

## 2024-04-18 ASSESSMENT — VISUAL ACUITY
OS_CC: 20/25
OD_CC: 20/25
METHOD: SNELLEN - LINEAR

## 2024-04-18 ASSESSMENT — SLIT LAMP EXAM - LIDS
COMMENTS: NORMAL
COMMENTS: NORMAL

## 2024-04-18 ASSESSMENT — TONOMETRY
OS_IOP_MMHG: 11
OD_IOP_MMHG: 11
IOP_METHOD: TONOPEN

## 2024-04-18 ASSESSMENT — EXTERNAL EXAM - RIGHT EYE: OD_EXAM: NORMAL

## 2024-04-18 ASSESSMENT — EXTERNAL EXAM - LEFT EYE: OS_EXAM: NORMAL

## 2024-04-18 NOTE — TELEPHONE ENCOUNTER
Pt called in asking for blood work orders to have her thyroid levels rechecked since change in pt dose.

## 2024-04-18 NOTE — PROGRESS NOTES
Assessment/Plan   Diagnoses and all orders for this visit:  Superficial punctate keratitis of left eye  - patient presents to triage clinic with mild discomfort OS  - No sign of corneal abrasions or ulcers. Some sign of surface irritation. Eyelash identified and removed from inferior fornix.   - Recommend AT QID left eye for comfort  - Return precautions given   - RTC PRN  - Follow up with Dr. Kamara as needed

## 2024-04-19 DIAGNOSIS — E78.2 HYPERLIPEMIA, MIXED: Primary | ICD-10-CM

## 2024-04-22 ENCOUNTER — OFFICE VISIT (OUTPATIENT)
Dept: PULMONOLOGY | Facility: CLINIC | Age: 59
End: 2024-04-22
Payer: COMMERCIAL

## 2024-04-22 VITALS
BODY MASS INDEX: 35.9 KG/M2 | OXYGEN SATURATION: 99 % | HEART RATE: 105 BPM | SYSTOLIC BLOOD PRESSURE: 140 MMHG | WEIGHT: 190 LBS | DIASTOLIC BLOOD PRESSURE: 81 MMHG | TEMPERATURE: 97 F

## 2024-04-22 DIAGNOSIS — F17.210 NICOTINE DEPENDENCE, CIGARETTES, UNCOMPLICATED: ICD-10-CM

## 2024-04-22 DIAGNOSIS — D21.9 GRANULAR CELL TUMOR: Primary | ICD-10-CM

## 2024-04-22 PROCEDURE — 99214 OFFICE O/P EST MOD 30 MIN: CPT | Performed by: INTERNAL MEDICINE

## 2024-04-22 PROCEDURE — 3079F DIAST BP 80-89 MM HG: CPT | Performed by: INTERNAL MEDICINE

## 2024-04-22 PROCEDURE — RXMED WILLOW AMBULATORY MEDICATION CHARGE

## 2024-04-22 PROCEDURE — 3077F SYST BP >= 140 MM HG: CPT | Performed by: INTERNAL MEDICINE

## 2024-04-22 PROCEDURE — 3008F BODY MASS INDEX DOCD: CPT | Performed by: INTERNAL MEDICINE

## 2024-04-22 RX ORDER — VARENICLINE TARTRATE 0.5 (11)-1
KIT ORAL
Qty: 53 EACH | Refills: 0 | Status: SHIPPED | OUTPATIENT
Start: 2024-04-22 | End: 2024-06-05 | Stop reason: SDUPTHER

## 2024-04-22 ASSESSMENT — ENCOUNTER SYMPTOMS
FACIAL SWELLING: 0
TREMORS: 0
JOINT SWELLING: 0
DYSURIA: 0
UNEXPECTED WEIGHT CHANGE: 0
COUGH: 0
PALPITATIONS: 0
NERVOUS/ANXIOUS: 0
HEMATURIA: 0
WHEEZING: 0
RHINORRHEA: 0
WEAKNESS: 0
ADENOPATHY: 0
EYE DISCHARGE: 0
STRIDOR: 0
SPEECH DIFFICULTY: 0
FREQUENCY: 0
ABDOMINAL DISTENTION: 0
CHOKING: 0
EYE REDNESS: 0
SINUS PRESSURE: 0
CONSTIPATION: 0
AGITATION: 0
NUMBNESS: 0
BRUISES/BLEEDS EASILY: 0
FATIGUE: 0
SHORTNESS OF BREATH: 0
ARTHRALGIAS: 0
SLEEP DISTURBANCE: 0
DIZZINESS: 0
HEADACHES: 0
NAUSEA: 0
DIFFICULTY URINATING: 0
LIGHT-HEADEDNESS: 0
FEVER: 0
APNEA: 0
SINUS PAIN: 0
ABDOMINAL PAIN: 0

## 2024-04-22 ASSESSMENT — PAIN SCALES - GENERAL: PAINLEVEL: 0-NO PAIN

## 2024-04-22 NOTE — PATIENT INSTRUCTIONS
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  In addition, I have ordered a starting pack of Chantix.

## 2024-04-22 NOTE — PROGRESS NOTES
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  Her last CT scan was about a year ago.  She does need follow-up and she needs to stop smoking.  Her lungs are clear to auscultation.    PLAN:   Have ordered a follow-up CT scan because of her granulosa cell tumor.  In addition, I have ordered a starting pack of Chantix.        HISTORY OF PRESENT ILLNESS:  The patient is a 58-year-old with a history of hypertension and obesity and long smoking history. She also has a multinodular goiter. She also has hyperlipidemia. Her hypertension is being treated with lisinopril HCTZ. It is noted that in  she had CT scan evidence of the following: Lobular density is seen in the anterior segment of the left upper lobe, measuring 4.3 x 1.6 x 1.6 cm in size; density seen within or extending into the adjacent subsegmental bronchus. The central portion of this lesion appears more solid than the peripheral portion which is branching and likely mucus plugging. There was mild peripheral atelectasis. Underlying mass could not be excluded. Bronchoscopy determined that she a granular cell tumor and has been followed since with near resolution of the mass is noted in 2017.     A CT scan from 2019 revealed the followin. Unchanged appearance of biapical emphysematous changes since prior. 2. Unchanged appearance of the residual linear soft tissue thickening in the anterior aspect of the left upper lobe consistent with patient's known granular cell tumor. 3. Stable appearance of a 3 mm solid subpleural nodule in the right upper lobe since prior CT dated 2017. Stable heterogeneously enlarged right thyroid lobe, consistent with patient's known multinodular goiter.     The patient was seen on 2019 with  continued to smoke 6 cigarettes/day. 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     Her last Ct from March 7, 2022 revealed the following: No significant interval change in several bandlike opacities in the left upper lobe compared to prior CT chest exams dated 12/17/2020 and 07/16/2019. Several additional pulmonary nodules as described above are also stable dating back to 07/16/2019. Continued attention on follow-up is recommended. 2. Mild to moderate upper lung predominant centrilobular and paraseptal emphysema. A right breast density was observed but mammogram was negative.     When seen on November 1 2022 it was reported that the patient had no major amount of coughing congestion or wheezing. Her blood pressure is under control. She has no dyspnea on exertion. She continues to be followed for the various nodular changes as outlined and she has emphysema. Her last CT scan in March of this year revealed stable findings. She has been a smoker of 6 cigarettes/day.    The CT scan from [place] 15, 2023 revealed the following    1.  Stable appearance of the lungs with unchanged appearance of left  upper lobe bandlike opacity in the region of the patient's known  granular cell tumor. No new nodule.  2. Mild diffuse emphysema with upper lung predominance.  3. Mild coronary artery calcification.  4. Other stable findings as described above.           The patient continues to smoke about a pack of cigarettes per day.  She is trying to cut down cigarettes.  She only smokes when she goes out socially drinking.  The latter.  She has no coughing congestion or wheezing.  She has no fevers or chills.  She has tolerated Chantix in the past.      No Known Allergies         Current Outpatient Medications:     ergocalciferol (Vitamin D-2) 1.25 MG (40963 UT) capsule, Take 1 capsule (50,000 Units) by mouth 1 (one) time per week., Disp: 12 capsule, Rfl: 0    levothyroxine (Synthroid, Levoxyl) 137 mcg tablet, Take 1 tablet (137 mcg) by mouth  once daily in the morning. Take before meals., Disp: 90 tablet, Rfl: 3    lisinopril 40 mg tablet, Take 1 tablet (40 mg) by mouth once daily., Disp: 90 tablet, Rfl: 1    rosuvastatin (Crestor) 40 mg tablet, TAKE 1 TABLET BY MOUTH ONCE DAILY, Disp: 90 tablet, Rfl: 3    valACYclovir (Valtrex) 500 mg tablet, TAKE 1 TABLET BY MOUTH ONCE DAILY, Disp: 90 tablet, Rfl: 0    varenicline (Chantix ALETHEA) 0.5 mg (11)- 1 mg (42) tablet, Days 1 to 3: 0.5 mg by mouth once a day. Days 4 to 7: 0.5 mg 2 times per day. Days 8 to end of treatment: 1 mg 2 times per day as directed on package., Disp: 53 each, Rfl: 0          Review of Systems   Constitutional:  Negative for fatigue, fever and unexpected weight change.   HENT:  Negative for congestion, facial swelling, nosebleeds, postnasal drip, rhinorrhea, sinus pressure and sinus pain.    Eyes:  Negative for discharge, redness and visual disturbance.   Respiratory:  Negative for apnea, cough, choking, shortness of breath, wheezing and stridor.    Cardiovascular:  Negative for chest pain, palpitations and leg swelling.   Gastrointestinal:  Negative for abdominal distention, abdominal pain, constipation and nausea.   Endocrine: Negative for cold intolerance and heat intolerance.   Genitourinary:  Negative for difficulty urinating, dysuria, frequency and hematuria.   Musculoskeletal:  Negative for arthralgias, gait problem and joint swelling.   Allergic/Immunologic: Negative for environmental allergies, food allergies and immunocompromised state.   Neurological:  Negative for dizziness, tremors, syncope, speech difficulty, weakness, light-headedness, numbness and headaches.   Hematological:  Negative for adenopathy. Does not bruise/bleed easily.   Psychiatric/Behavioral:  Negative for agitation, behavioral problems and sleep disturbance. The patient is not nervous/anxious.         Vitals:    04/22/24 1255   BP: 140/81   Pulse: 105   Temp: 36.1 °C (97 °F)   SpO2: 99%        Physical  Exam  Vitals reviewed.   Constitutional:       Appearance: Normal appearance.   HENT:      Head: Normocephalic and atraumatic.   Eyes:      Extraocular Movements: Extraocular movements intact.   Cardiovascular:      Rate and Rhythm: Normal rate and regular rhythm.      Heart sounds: No murmur heard.     No friction rub. No gallop.   Pulmonary:      Effort: Pulmonary effort is normal. No respiratory distress.      Breath sounds: Normal breath sounds. No stridor. No wheezing, rhonchi or rales.   Chest:      Chest wall: No tenderness.   Abdominal:      General: Abdomen is flat. There is no distension.      Palpations: Abdomen is soft. There is no mass.      Tenderness: There is no abdominal tenderness.   Musculoskeletal:         General: Normal range of motion.      Cervical back: Normal range of motion.      Right lower leg: No edema.      Left lower leg: No edema.   Skin:     General: Skin is warm and dry.   Neurological:      Mental Status: She is alert and oriented to person, place, and time.   Psychiatric:         Mood and Affect: Mood normal.         Behavior: Behavior normal.

## 2024-04-24 ENCOUNTER — PHARMACY VISIT (OUTPATIENT)
Dept: PHARMACY | Facility: CLINIC | Age: 59
End: 2024-04-24
Payer: COMMERCIAL

## 2024-05-09 ENCOUNTER — HOSPITAL ENCOUNTER (OUTPATIENT)
Dept: RADIOLOGY | Facility: HOSPITAL | Age: 59
Discharge: HOME | End: 2024-05-09
Payer: COMMERCIAL

## 2024-05-09 DIAGNOSIS — D21.9 GRANULAR CELL TUMOR: ICD-10-CM

## 2024-05-09 PROCEDURE — 71250 CT THORAX DX C-: CPT | Performed by: RADIOLOGY

## 2024-05-09 PROCEDURE — 71250 CT THORAX DX C-: CPT

## 2024-05-21 ENCOUNTER — APPOINTMENT (OUTPATIENT)
Dept: PULMONOLOGY | Facility: CLINIC | Age: 59
End: 2024-05-21
Payer: COMMERCIAL

## 2024-05-28 DIAGNOSIS — E55.9 VITAMIN D DEFICIENCY: ICD-10-CM

## 2024-05-28 DIAGNOSIS — Z00.00 HEALTHCARE MAINTENANCE: ICD-10-CM

## 2024-05-28 PROCEDURE — RXMED WILLOW AMBULATORY MEDICATION CHARGE

## 2024-05-28 RX ORDER — ERGOCALCIFEROL 1.25 MG/1
50000 CAPSULE ORAL
Qty: 12 CAPSULE | Refills: 0 | Status: SHIPPED | OUTPATIENT
Start: 2024-06-02 | End: 2024-08-25

## 2024-05-28 RX ORDER — VALACYCLOVIR HYDROCHLORIDE 500 MG/1
500 TABLET, FILM COATED ORAL DAILY
Qty: 90 TABLET | Refills: 0 | Status: SHIPPED | OUTPATIENT
Start: 2024-05-28 | End: 2025-05-28

## 2024-05-29 ENCOUNTER — PHARMACY VISIT (OUTPATIENT)
Dept: PHARMACY | Facility: CLINIC | Age: 59
End: 2024-05-29
Payer: COMMERCIAL

## 2024-06-05 ENCOUNTER — LAB (OUTPATIENT)
Dept: LAB | Facility: LAB | Age: 59
End: 2024-06-05
Payer: COMMERCIAL

## 2024-06-05 ENCOUNTER — PHARMACY VISIT (OUTPATIENT)
Dept: PHARMACY | Facility: CLINIC | Age: 59
End: 2024-06-05
Payer: COMMERCIAL

## 2024-06-05 DIAGNOSIS — E78.2 HYPERLIPEMIA, MIXED: ICD-10-CM

## 2024-06-05 DIAGNOSIS — E78.5 HYPERLIPIDEMIA, UNSPECIFIED HYPERLIPIDEMIA TYPE: ICD-10-CM

## 2024-06-05 LAB
ALT SERPL W P-5'-P-CCNC: 32 U/L (ref 7–45)
AST SERPL W P-5'-P-CCNC: 17 U/L (ref 9–39)
CHOLEST SERPL-MCNC: 195 MG/DL (ref 0–199)
CHOLESTEROL/HDL RATIO: 5.2
HDLC SERPL-MCNC: 37.8 MG/DL
LDLC SERPL CALC-MCNC: ABNORMAL MG/DL
NON HDL CHOLESTEROL: 157 MG/DL (ref 0–149)
TRIGL SERPL-MCNC: 432 MG/DL (ref 0–149)
VLDL: ABNORMAL

## 2024-06-05 PROCEDURE — RXMED WILLOW AMBULATORY MEDICATION CHARGE

## 2024-06-05 PROCEDURE — 36415 COLL VENOUS BLD VENIPUNCTURE: CPT

## 2024-06-05 PROCEDURE — 84450 TRANSFERASE (AST) (SGOT): CPT

## 2024-06-05 PROCEDURE — 84460 ALANINE AMINO (ALT) (SGPT): CPT

## 2024-06-05 PROCEDURE — 80061 LIPID PANEL: CPT

## 2024-06-06 ENCOUNTER — PHARMACY VISIT (OUTPATIENT)
Dept: PHARMACY | Facility: CLINIC | Age: 59
End: 2024-06-06
Payer: COMMERCIAL

## 2024-06-06 DIAGNOSIS — E78.2 HYPERLIPEMIA, MIXED: Primary | ICD-10-CM

## 2024-06-06 PROCEDURE — RXMED WILLOW AMBULATORY MEDICATION CHARGE

## 2024-06-06 RX ORDER — EZETIMIBE 10 MG/1
10 TABLET ORAL DAILY
Qty: 30 TABLET | Refills: 5 | Status: SHIPPED | OUTPATIENT
Start: 2024-06-06 | End: 2024-12-03

## 2024-06-26 ENCOUNTER — PHARMACY VISIT (OUTPATIENT)
Dept: PHARMACY | Facility: CLINIC | Age: 59
End: 2024-06-26
Payer: COMMERCIAL

## 2024-06-26 PROCEDURE — RXMED WILLOW AMBULATORY MEDICATION CHARGE

## 2024-07-12 ENCOUNTER — APPOINTMENT (OUTPATIENT)
Dept: GASTROENTEROLOGY | Facility: EXTERNAL LOCATION | Age: 59
End: 2024-07-12

## 2024-07-31 ENCOUNTER — LAB (OUTPATIENT)
Dept: LAB | Facility: LAB | Age: 59
End: 2024-07-31
Payer: COMMERCIAL

## 2024-07-31 DIAGNOSIS — E78.2 HYPERLIPEMIA, MIXED: Primary | ICD-10-CM

## 2024-07-31 DIAGNOSIS — E78.2 HYPERLIPEMIA, MIXED: ICD-10-CM

## 2024-07-31 LAB
ALT SERPL W P-5'-P-CCNC: 28 U/L (ref 7–45)
AST SERPL W P-5'-P-CCNC: 15 U/L (ref 9–39)
CHOLEST SERPL-MCNC: 219 MG/DL (ref 0–199)
CHOLESTEROL/HDL RATIO: 6.4
HDLC SERPL-MCNC: 34.3 MG/DL
LDLC SERPL CALC-MCNC: 143 MG/DL
NON HDL CHOLESTEROL: 185 MG/DL (ref 0–149)
TRIGL SERPL-MCNC: 210 MG/DL (ref 0–149)
VLDL: 42 MG/DL (ref 0–40)

## 2024-07-31 PROCEDURE — 36415 COLL VENOUS BLD VENIPUNCTURE: CPT

## 2024-07-31 PROCEDURE — 84450 TRANSFERASE (AST) (SGOT): CPT

## 2024-07-31 PROCEDURE — 84460 ALANINE AMINO (ALT) (SGPT): CPT

## 2024-07-31 PROCEDURE — 80061 LIPID PANEL: CPT

## 2024-08-09 ENCOUNTER — APPOINTMENT (OUTPATIENT)
Dept: PRIMARY CARE | Facility: CLINIC | Age: 59
End: 2024-08-09

## 2024-08-29 ENCOUNTER — PHARMACY VISIT (OUTPATIENT)
Dept: PHARMACY | Facility: CLINIC | Age: 59
End: 2024-08-29
Payer: COMMERCIAL

## 2024-08-29 ENCOUNTER — APPOINTMENT (OUTPATIENT)
Dept: PRIMARY CARE | Facility: CLINIC | Age: 59
End: 2024-08-29
Payer: COMMERCIAL

## 2024-08-29 DIAGNOSIS — E55.9 VITAMIN D DEFICIENCY: ICD-10-CM

## 2024-08-29 PROCEDURE — RXMED WILLOW AMBULATORY MEDICATION CHARGE

## 2024-08-30 PROCEDURE — RXMED WILLOW AMBULATORY MEDICATION CHARGE

## 2024-08-30 RX ORDER — ERGOCALCIFEROL 1.25 MG/1
50000 CAPSULE ORAL
Qty: 12 CAPSULE | Refills: 0 | Status: SHIPPED | OUTPATIENT
Start: 2024-09-01 | End: 2024-11-24

## 2024-09-04 ENCOUNTER — PHARMACY VISIT (OUTPATIENT)
Dept: PHARMACY | Facility: CLINIC | Age: 59
End: 2024-09-04
Payer: COMMERCIAL

## 2024-09-24 DIAGNOSIS — E03.9 HYPOTHYROIDISM, UNSPECIFIED TYPE: ICD-10-CM

## 2024-09-24 RX ORDER — LEVOTHYROXINE SODIUM 137 UG/1
137 TABLET ORAL
Qty: 90 TABLET | Refills: 3 | Status: SHIPPED | OUTPATIENT
Start: 2024-09-24 | End: 2025-09-24

## 2024-09-26 ENCOUNTER — PHARMACY VISIT (OUTPATIENT)
Dept: PHARMACY | Facility: CLINIC | Age: 59
End: 2024-09-26
Payer: COMMERCIAL

## 2024-09-26 PROCEDURE — RXMED WILLOW AMBULATORY MEDICATION CHARGE

## 2024-10-18 ENCOUNTER — HOSPITAL ENCOUNTER (OUTPATIENT)
Dept: RADIOLOGY | Facility: HOSPITAL | Age: 59
Discharge: HOME | End: 2024-10-18
Payer: COMMERCIAL

## 2024-10-18 DIAGNOSIS — E78.2 HYPERLIPEMIA, MIXED: ICD-10-CM

## 2024-10-18 DIAGNOSIS — R92.2 DENSE BREASTS: Primary | ICD-10-CM

## 2024-10-18 DIAGNOSIS — R92.30 DENSE BREASTS: Primary | ICD-10-CM

## 2024-10-18 DIAGNOSIS — R93.1 AGATSTON CORONARY ARTERY CALCIUM SCORE BETWEEN 200 AND 399: Primary | ICD-10-CM

## 2024-10-18 PROCEDURE — 75571 CT HRT W/O DYE W/CA TEST: CPT

## 2024-10-18 PROCEDURE — RXMED WILLOW AMBULATORY MEDICATION CHARGE

## 2024-10-18 RX ORDER — EZETIMIBE 10 MG/1
10 TABLET ORAL DAILY
Qty: 30 TABLET | Refills: 5 | Status: SHIPPED | OUTPATIENT
Start: 2024-10-18 | End: 2025-04-16

## 2024-10-23 ENCOUNTER — PHARMACY VISIT (OUTPATIENT)
Dept: PHARMACY | Facility: CLINIC | Age: 59
End: 2024-10-23
Payer: COMMERCIAL

## 2024-10-23 PROCEDURE — RXOTC WILLOW AMBULATORY OTC CHARGE

## 2024-10-28 DIAGNOSIS — Z12.11 COLON CANCER SCREENING: ICD-10-CM

## 2024-10-28 PROCEDURE — RXMED WILLOW AMBULATORY MEDICATION CHARGE

## 2024-10-28 RX ORDER — POLYETHYLENE GLYCOL 3350, SODIUM SULFATE ANHYDROUS, SODIUM BICARBONATE, SODIUM CHLORIDE, POTASSIUM CHLORIDE 236; 22.74; 6.74; 5.86; 2.97 G/4L; G/4L; G/4L; G/4L; G/4L
POWDER, FOR SOLUTION ORAL
Qty: 4000 ML | Refills: 0 | Status: SHIPPED | OUTPATIENT
Start: 2024-10-28

## 2024-10-29 ENCOUNTER — PHARMACY VISIT (OUTPATIENT)
Dept: PHARMACY | Facility: CLINIC | Age: 59
End: 2024-10-29
Payer: COMMERCIAL

## 2024-10-30 ENCOUNTER — HOSPITAL ENCOUNTER (OUTPATIENT)
Dept: RADIOLOGY | Facility: HOSPITAL | Age: 59
Discharge: HOME | End: 2024-10-30
Payer: COMMERCIAL

## 2024-10-30 ENCOUNTER — APPOINTMENT (OUTPATIENT)
Dept: CARDIOLOGY | Facility: HOSPITAL | Age: 59
End: 2024-10-30
Payer: COMMERCIAL

## 2024-10-30 DIAGNOSIS — R92.30 DENSE BREASTS: ICD-10-CM

## 2024-10-30 DIAGNOSIS — R92.2 DENSE BREASTS: ICD-10-CM

## 2024-11-08 ENCOUNTER — APPOINTMENT (OUTPATIENT)
Dept: GASTROENTEROLOGY | Facility: EXTERNAL LOCATION | Age: 59
End: 2024-11-08
Payer: COMMERCIAL

## 2024-11-08 DIAGNOSIS — Z12.11 ENCOUNTER FOR SCREENING FOR MALIGNANT NEOPLASM OF COLON: Primary | ICD-10-CM

## 2024-11-08 PROCEDURE — 45378 DIAGNOSTIC COLONOSCOPY: CPT | Performed by: INTERNAL MEDICINE

## 2024-11-13 ENCOUNTER — APPOINTMENT (OUTPATIENT)
Dept: CARDIOLOGY | Facility: HOSPITAL | Age: 59
End: 2024-11-13
Payer: COMMERCIAL

## 2024-11-15 ENCOUNTER — HOSPITAL ENCOUNTER (OUTPATIENT)
Facility: HOSPITAL | Age: 59
Setting detail: OBSERVATION
Discharge: HOME | End: 2024-11-16
Attending: EMERGENCY MEDICINE | Admitting: STUDENT IN AN ORGANIZED HEALTH CARE EDUCATION/TRAINING PROGRAM
Payer: COMMERCIAL

## 2024-11-15 ENCOUNTER — APPOINTMENT (OUTPATIENT)
Dept: RADIOLOGY | Facility: HOSPITAL | Age: 59
End: 2024-11-15
Payer: COMMERCIAL

## 2024-11-15 ENCOUNTER — APPOINTMENT (OUTPATIENT)
Dept: CARDIOLOGY | Facility: HOSPITAL | Age: 59
End: 2024-11-15
Payer: COMMERCIAL

## 2024-11-15 DIAGNOSIS — Z00.00 HEALTHCARE MAINTENANCE: ICD-10-CM

## 2024-11-15 DIAGNOSIS — M54.50 ACUTE LEFT-SIDED LOW BACK PAIN, UNSPECIFIED WHETHER SCIATICA PRESENT: ICD-10-CM

## 2024-11-15 DIAGNOSIS — R07.9 CHEST PAIN, UNSPECIFIED TYPE: Primary | ICD-10-CM

## 2024-11-15 DIAGNOSIS — H52.13 MYOPIA OF BOTH EYES WITH ASTIGMATISM AND PRESBYOPIA: ICD-10-CM

## 2024-11-15 DIAGNOSIS — H52.203 MYOPIA OF BOTH EYES WITH ASTIGMATISM AND PRESBYOPIA: ICD-10-CM

## 2024-11-15 DIAGNOSIS — E55.9 VITAMIN D DEFICIENCY: ICD-10-CM

## 2024-11-15 DIAGNOSIS — I10 HYPERTENSION, UNSPECIFIED TYPE: ICD-10-CM

## 2024-11-15 DIAGNOSIS — H52.4 MYOPIA OF BOTH EYES WITH ASTIGMATISM AND PRESBYOPIA: ICD-10-CM

## 2024-11-15 LAB
ALBUMIN SERPL BCP-MCNC: 4.2 G/DL (ref 3.4–5)
ALP SERPL-CCNC: 65 U/L (ref 33–136)
ALT SERPL W P-5'-P-CCNC: 25 U/L (ref 7–45)
ANION GAP SERPL CALC-SCNC: 11 MMOL/L (ref 10–20)
AST SERPL W P-5'-P-CCNC: 15 U/L (ref 9–39)
BASOPHILS # BLD AUTO: 0.03 X10*3/UL (ref 0–0.1)
BASOPHILS NFR BLD AUTO: 0.3 %
BILIRUB SERPL-MCNC: 0.3 MG/DL (ref 0–1.2)
BNP SERPL-MCNC: 12 PG/ML (ref 0–99)
BUN SERPL-MCNC: 23 MG/DL (ref 6–23)
CALCIUM SERPL-MCNC: 8.9 MG/DL (ref 8.6–10.3)
CARDIAC TROPONIN I PNL SERPL HS: 3 NG/L (ref 0–13)
CARDIAC TROPONIN I PNL SERPL HS: 3 NG/L (ref 0–13)
CHLORIDE SERPL-SCNC: 107 MMOL/L (ref 98–107)
CHOLEST SERPL-MCNC: 223 MG/DL (ref 0–199)
CHOLESTEROL/HDL RATIO: 5.6
CO2 SERPL-SCNC: 24 MMOL/L (ref 21–32)
CREAT SERPL-MCNC: 0.82 MG/DL (ref 0.5–1.05)
EGFRCR SERPLBLD CKD-EPI 2021: 81 ML/MIN/1.73M*2
EOSINOPHIL # BLD AUTO: 0.25 X10*3/UL (ref 0–0.7)
EOSINOPHIL NFR BLD AUTO: 2.6 %
ERYTHROCYTE [DISTWIDTH] IN BLOOD BY AUTOMATED COUNT: 15.4 % (ref 11.5–14.5)
GLUCOSE SERPL-MCNC: 98 MG/DL (ref 74–99)
HCT VFR BLD AUTO: 38.7 % (ref 36–46)
HDLC SERPL-MCNC: 39.9 MG/DL
HGB BLD-MCNC: 12.8 G/DL (ref 12–16)
IMM GRANULOCYTES # BLD AUTO: 0.03 X10*3/UL (ref 0–0.7)
IMM GRANULOCYTES NFR BLD AUTO: 0.3 % (ref 0–0.9)
LDLC SERPL CALC-MCNC: 156 MG/DL
LIPASE SERPL-CCNC: 31 U/L (ref 9–82)
LYMPHOCYTES # BLD AUTO: 4.52 X10*3/UL (ref 1.2–4.8)
LYMPHOCYTES NFR BLD AUTO: 46.1 %
MCH RBC QN AUTO: 28.8 PG (ref 26–34)
MCHC RBC AUTO-ENTMCNC: 33.1 G/DL (ref 32–36)
MCV RBC AUTO: 87 FL (ref 80–100)
MONOCYTES # BLD AUTO: 0.39 X10*3/UL (ref 0.1–1)
MONOCYTES NFR BLD AUTO: 4 %
NEUTROPHILS # BLD AUTO: 4.58 X10*3/UL (ref 1.2–7.7)
NEUTROPHILS NFR BLD AUTO: 46.7 %
NON HDL CHOLESTEROL: 183 MG/DL (ref 0–149)
NRBC BLD-RTO: 0 /100 WBCS (ref 0–0)
PLATELET # BLD AUTO: 380 X10*3/UL (ref 150–450)
POTASSIUM SERPL-SCNC: 4 MMOL/L (ref 3.5–5.3)
PROT SERPL-MCNC: 7.2 G/DL (ref 6.4–8.2)
RBC # BLD AUTO: 4.45 X10*6/UL (ref 4–5.2)
SODIUM SERPL-SCNC: 138 MMOL/L (ref 136–145)
TRIGL SERPL-MCNC: 135 MG/DL (ref 0–149)
TSH SERPL-ACNC: 5.08 MIU/L (ref 0.44–3.98)
VLDL: 27 MG/DL (ref 0–40)
WBC # BLD AUTO: 9.8 X10*3/UL (ref 4.4–11.3)

## 2024-11-15 PROCEDURE — G0378 HOSPITAL OBSERVATION PER HR: HCPCS

## 2024-11-15 PROCEDURE — 93005 ELECTROCARDIOGRAM TRACING: CPT

## 2024-11-15 PROCEDURE — 36415 COLL VENOUS BLD VENIPUNCTURE: CPT | Performed by: EMERGENCY MEDICINE

## 2024-11-15 PROCEDURE — 85025 COMPLETE CBC W/AUTO DIFF WBC: CPT | Performed by: EMERGENCY MEDICINE

## 2024-11-15 PROCEDURE — 2550000001 HC RX 255 CONTRASTS: Performed by: EMERGENCY MEDICINE

## 2024-11-15 PROCEDURE — 2500000002 HC RX 250 W HCPCS SELF ADMINISTERED DRUGS (ALT 637 FOR MEDICARE OP, ALT 636 FOR OP/ED): Performed by: EMERGENCY MEDICINE

## 2024-11-15 PROCEDURE — 83036 HEMOGLOBIN GLYCOSYLATED A1C: CPT | Mod: AHULAB | Performed by: NURSE PRACTITIONER

## 2024-11-15 PROCEDURE — 2500000001 HC RX 250 WO HCPCS SELF ADMINISTERED DRUGS (ALT 637 FOR MEDICARE OP): Performed by: EMERGENCY MEDICINE

## 2024-11-15 PROCEDURE — 71275 CT ANGIOGRAPHY CHEST: CPT | Mod: FOREIGN READ | Performed by: RADIOLOGY

## 2024-11-15 PROCEDURE — 2500000002 HC RX 250 W HCPCS SELF ADMINISTERED DRUGS (ALT 637 FOR MEDICARE OP, ALT 636 FOR OP/ED): Performed by: NURSE PRACTITIONER

## 2024-11-15 PROCEDURE — 74174 CTA ABD&PLVS W/CONTRAST: CPT | Mod: FOREIGN READ | Performed by: RADIOLOGY

## 2024-11-15 PROCEDURE — RXMED WILLOW AMBULATORY MEDICATION CHARGE

## 2024-11-15 PROCEDURE — 83880 ASSAY OF NATRIURETIC PEPTIDE: CPT | Performed by: EMERGENCY MEDICINE

## 2024-11-15 PROCEDURE — 80061 LIPID PANEL: CPT | Performed by: NURSE PRACTITIONER

## 2024-11-15 PROCEDURE — 71275 CT ANGIOGRAPHY CHEST: CPT

## 2024-11-15 PROCEDURE — 96374 THER/PROPH/DIAG INJ IV PUSH: CPT | Mod: 59

## 2024-11-15 PROCEDURE — 84484 ASSAY OF TROPONIN QUANT: CPT | Mod: 91 | Performed by: EMERGENCY MEDICINE

## 2024-11-15 PROCEDURE — 84484 ASSAY OF TROPONIN QUANT: CPT | Performed by: EMERGENCY MEDICINE

## 2024-11-15 PROCEDURE — 80053 COMPREHEN METABOLIC PANEL: CPT | Performed by: EMERGENCY MEDICINE

## 2024-11-15 PROCEDURE — 99285 EMERGENCY DEPT VISIT HI MDM: CPT | Mod: 25

## 2024-11-15 PROCEDURE — 83690 ASSAY OF LIPASE: CPT | Performed by: EMERGENCY MEDICINE

## 2024-11-15 PROCEDURE — 84443 ASSAY THYROID STIM HORMONE: CPT | Performed by: NURSE PRACTITIONER

## 2024-11-15 PROCEDURE — 96375 TX/PRO/DX INJ NEW DRUG ADDON: CPT | Mod: 59

## 2024-11-15 PROCEDURE — 2500000004 HC RX 250 GENERAL PHARMACY W/ HCPCS (ALT 636 FOR OP/ED): Performed by: EMERGENCY MEDICINE

## 2024-11-15 RX ORDER — LEVOTHYROXINE SODIUM 137 UG/1
137 TABLET ORAL
Status: DISCONTINUED | OUTPATIENT
Start: 2024-11-16 | End: 2024-11-16 | Stop reason: HOSPADM

## 2024-11-15 RX ORDER — ACETAMINOPHEN 325 MG/1
650 TABLET ORAL EVERY 4 HOURS PRN
Status: DISCONTINUED | OUTPATIENT
Start: 2024-11-15 | End: 2024-11-16 | Stop reason: HOSPADM

## 2024-11-15 RX ORDER — EZETIMIBE 10 MG/1
10 TABLET ORAL DAILY
Status: DISCONTINUED | OUTPATIENT
Start: 2024-11-16 | End: 2024-11-16 | Stop reason: HOSPADM

## 2024-11-15 RX ORDER — ONDANSETRON 4 MG/1
4 TABLET, FILM COATED ORAL EVERY 8 HOURS PRN
Status: DISCONTINUED | OUTPATIENT
Start: 2024-11-15 | End: 2024-11-16 | Stop reason: HOSPADM

## 2024-11-15 RX ORDER — KETOROLAC TROMETHAMINE 30 MG/ML
15 INJECTION, SOLUTION INTRAMUSCULAR; INTRAVENOUS ONCE
Status: COMPLETED | OUTPATIENT
Start: 2024-11-15 | End: 2024-11-15

## 2024-11-15 RX ORDER — ASPIRIN 81 MG/1
81 TABLET ORAL DAILY
Status: DISCONTINUED | OUTPATIENT
Start: 2024-11-16 | End: 2024-11-16 | Stop reason: HOSPADM

## 2024-11-15 RX ORDER — ONDANSETRON HYDROCHLORIDE 2 MG/ML
4 INJECTION, SOLUTION INTRAVENOUS EVERY 8 HOURS PRN
Status: DISCONTINUED | OUTPATIENT
Start: 2024-11-15 | End: 2024-11-16 | Stop reason: HOSPADM

## 2024-11-15 RX ORDER — ACETAMINOPHEN 160 MG/5ML
650 SOLUTION ORAL EVERY 4 HOURS PRN
Status: DISCONTINUED | OUTPATIENT
Start: 2024-11-15 | End: 2024-11-16 | Stop reason: HOSPADM

## 2024-11-15 RX ORDER — NAPROXEN SODIUM 220 MG/1
324 TABLET, FILM COATED ORAL ONCE
Status: COMPLETED | OUTPATIENT
Start: 2024-11-15 | End: 2024-11-15

## 2024-11-15 RX ORDER — ROSUVASTATIN CALCIUM 20 MG/1
40 TABLET, COATED ORAL NIGHTLY
Status: DISCONTINUED | OUTPATIENT
Start: 2024-11-15 | End: 2024-11-16 | Stop reason: HOSPADM

## 2024-11-15 RX ORDER — SUCRALFATE 1 G/10ML
1 SUSPENSION ORAL ONCE
Status: COMPLETED | OUTPATIENT
Start: 2024-11-15 | End: 2024-11-15

## 2024-11-15 RX ORDER — POLYETHYLENE GLYCOL 3350 17 G/17G
17 POWDER, FOR SOLUTION ORAL DAILY PRN
Status: DISCONTINUED | OUTPATIENT
Start: 2024-11-15 | End: 2024-11-16 | Stop reason: HOSPADM

## 2024-11-15 RX ORDER — VALACYCLOVIR HYDROCHLORIDE 500 MG/1
500 TABLET, FILM COATED ORAL DAILY
Qty: 90 TABLET | Refills: 0 | Status: SHIPPED | OUTPATIENT
Start: 2024-11-15 | End: 2025-11-15

## 2024-11-15 RX ORDER — ENOXAPARIN SODIUM 100 MG/ML
40 INJECTION SUBCUTANEOUS EVERY 24 HOURS
Status: DISCONTINUED | OUTPATIENT
Start: 2024-11-15 | End: 2024-11-16 | Stop reason: HOSPADM

## 2024-11-15 RX ORDER — ACETAMINOPHEN 650 MG/1
650 SUPPOSITORY RECTAL EVERY 4 HOURS PRN
Status: DISCONTINUED | OUTPATIENT
Start: 2024-11-15 | End: 2024-11-16 | Stop reason: HOSPADM

## 2024-11-15 RX ORDER — LISINOPRIL 20 MG/1
40 TABLET ORAL DAILY
Status: DISCONTINUED | OUTPATIENT
Start: 2024-11-16 | End: 2024-11-16 | Stop reason: HOSPADM

## 2024-11-15 RX ADMIN — IOHEXOL 90 ML: 350 INJECTION, SOLUTION INTRAVENOUS at 18:52

## 2024-11-15 RX ADMIN — KETOROLAC TROMETHAMINE 15 MG: 30 INJECTION, SOLUTION INTRAMUSCULAR at 20:47

## 2024-11-15 RX ADMIN — HYDROMORPHONE HYDROCHLORIDE 0.5 MG: 1 INJECTION, SOLUTION INTRAMUSCULAR; INTRAVENOUS; SUBCUTANEOUS at 17:47

## 2024-11-15 RX ADMIN — ASPIRIN 81 MG 324 MG: 81 TABLET ORAL at 20:47

## 2024-11-15 RX ADMIN — SUCRALFATE 1 G: 1 SUSPENSION ORAL at 20:47

## 2024-11-15 RX ADMIN — ROSUVASTATIN 40 MG: 20 TABLET, FILM COATED ORAL at 22:50

## 2024-11-15 SDOH — ECONOMIC STABILITY: FOOD INSECURITY: WITHIN THE PAST 12 MONTHS, THE FOOD YOU BOUGHT JUST DIDN'T LAST AND YOU DIDN'T HAVE MONEY TO GET MORE.: NEVER TRUE

## 2024-11-15 SDOH — SOCIAL STABILITY: SOCIAL INSECURITY: DOES ANYONE TRY TO KEEP YOU FROM HAVING/CONTACTING OTHER FRIENDS OR DOING THINGS OUTSIDE YOUR HOME?: NO

## 2024-11-15 SDOH — ECONOMIC STABILITY: HOUSING INSECURITY: AT ANY TIME IN THE PAST 12 MONTHS, WERE YOU HOMELESS OR LIVING IN A SHELTER (INCLUDING NOW)?: NO

## 2024-11-15 SDOH — ECONOMIC STABILITY: HOUSING INSECURITY: IN THE LAST 12 MONTHS, WAS THERE A TIME WHEN YOU WERE NOT ABLE TO PAY THE MORTGAGE OR RENT ON TIME?: NO

## 2024-11-15 SDOH — ECONOMIC STABILITY: FOOD INSECURITY: WITHIN THE PAST 12 MONTHS, YOU WORRIED THAT YOUR FOOD WOULD RUN OUT BEFORE YOU GOT THE MONEY TO BUY MORE.: NEVER TRUE

## 2024-11-15 SDOH — SOCIAL STABILITY: SOCIAL INSECURITY: WITHIN THE LAST YEAR, HAVE YOU BEEN AFRAID OF YOUR PARTNER OR EX-PARTNER?: NO

## 2024-11-15 SDOH — ECONOMIC STABILITY: FOOD INSECURITY: HOW HARD IS IT FOR YOU TO PAY FOR THE VERY BASICS LIKE FOOD, HOUSING, MEDICAL CARE, AND HEATING?: NOT HARD AT ALL

## 2024-11-15 SDOH — SOCIAL STABILITY: SOCIAL INSECURITY: WERE YOU ABLE TO COMPLETE ALL THE BEHAVIORAL HEALTH SCREENINGS?: YES

## 2024-11-15 SDOH — SOCIAL STABILITY: SOCIAL INSECURITY
WITHIN THE LAST YEAR, HAVE YOU BEEN RAPED OR FORCED TO HAVE ANY KIND OF SEXUAL ACTIVITY BY YOUR PARTNER OR EX-PARTNER?: NO

## 2024-11-15 SDOH — SOCIAL STABILITY: SOCIAL INSECURITY
WITHIN THE LAST YEAR, HAVE YOU BEEN KICKED, HIT, SLAPPED, OR OTHERWISE PHYSICALLY HURT BY YOUR PARTNER OR EX-PARTNER?: NO

## 2024-11-15 SDOH — ECONOMIC STABILITY: INCOME INSECURITY: IN THE PAST 12 MONTHS HAS THE ELECTRIC, GAS, OIL, OR WATER COMPANY THREATENED TO SHUT OFF SERVICES IN YOUR HOME?: NO

## 2024-11-15 SDOH — SOCIAL STABILITY: SOCIAL INSECURITY: WITHIN THE LAST YEAR, HAVE YOU BEEN HUMILIATED OR EMOTIONALLY ABUSED IN OTHER WAYS BY YOUR PARTNER OR EX-PARTNER?: NO

## 2024-11-15 SDOH — SOCIAL STABILITY: SOCIAL INSECURITY: HAVE YOU HAD ANY THOUGHTS OF HARMING ANYONE ELSE?: NO

## 2024-11-15 SDOH — SOCIAL STABILITY: SOCIAL INSECURITY: HAS ANYONE EVER THREATENED TO HURT YOUR FAMILY OR YOUR PETS?: NO

## 2024-11-15 SDOH — SOCIAL STABILITY: SOCIAL INSECURITY: ARE YOU OR HAVE YOU BEEN THREATENED OR ABUSED PHYSICALLY, EMOTIONALLY, OR SEXUALLY BY ANYONE?: NO

## 2024-11-15 SDOH — SOCIAL STABILITY: SOCIAL INSECURITY: DO YOU FEEL ANYONE HAS EXPLOITED OR TAKEN ADVANTAGE OF YOU FINANCIALLY OR OF YOUR PERSONAL PROPERTY?: NO

## 2024-11-15 SDOH — SOCIAL STABILITY: SOCIAL INSECURITY: ARE THERE ANY APPARENT SIGNS OF INJURIES/BEHAVIORS THAT COULD BE RELATED TO ABUSE/NEGLECT?: NO

## 2024-11-15 SDOH — SOCIAL STABILITY: SOCIAL INSECURITY: ABUSE: ADULT

## 2024-11-15 SDOH — SOCIAL STABILITY: SOCIAL INSECURITY: DO YOU FEEL UNSAFE GOING BACK TO THE PLACE WHERE YOU ARE LIVING?: NO

## 2024-11-15 SDOH — ECONOMIC STABILITY: TRANSPORTATION INSECURITY: IN THE PAST 12 MONTHS, HAS LACK OF TRANSPORTATION KEPT YOU FROM MEDICAL APPOINTMENTS OR FROM GETTING MEDICATIONS?: NO

## 2024-11-15 SDOH — ECONOMIC STABILITY: HOUSING INSECURITY: IN THE PAST 12 MONTHS, HOW MANY TIMES HAVE YOU MOVED WHERE YOU WERE LIVING?: 1

## 2024-11-15 SDOH — SOCIAL STABILITY: SOCIAL INSECURITY: HAVE YOU HAD THOUGHTS OF HARMING ANYONE ELSE?: NO

## 2024-11-15 ASSESSMENT — ACTIVITIES OF DAILY LIVING (ADL)
FEEDING YOURSELF: INDEPENDENT
BATHING: INDEPENDENT
GROOMING: INDEPENDENT
DRESSING YOURSELF: INDEPENDENT
WALKS IN HOME: INDEPENDENT
ADEQUATE_TO_COMPLETE_ADL: YES
LACK_OF_TRANSPORTATION: NO
TOILETING: INDEPENDENT
HEARING - RIGHT EAR: FUNCTIONAL
HEARING - LEFT EAR: FUNCTIONAL
JUDGMENT_ADEQUATE_SAFELY_COMPLETE_DAILY_ACTIVITIES: YES
PATIENT'S MEMORY ADEQUATE TO SAFELY COMPLETE DAILY ACTIVITIES?: YES
LACK_OF_TRANSPORTATION: NO

## 2024-11-15 ASSESSMENT — PATIENT HEALTH QUESTIONNAIRE - PHQ9
2. FEELING DOWN, DEPRESSED OR HOPELESS: NOT AT ALL
1. LITTLE INTEREST OR PLEASURE IN DOING THINGS: NOT AT ALL
SUM OF ALL RESPONSES TO PHQ9 QUESTIONS 1 & 2: 0

## 2024-11-15 ASSESSMENT — COGNITIVE AND FUNCTIONAL STATUS - GENERAL
PATIENT BASELINE BEDBOUND: NO
DAILY ACTIVITIY SCORE: 24
MOBILITY SCORE: 24

## 2024-11-15 ASSESSMENT — LIFESTYLE VARIABLES
AUDIT-C TOTAL SCORE: 0
HOW MANY STANDARD DRINKS CONTAINING ALCOHOL DO YOU HAVE ON A TYPICAL DAY: PATIENT DOES NOT DRINK
HOW OFTEN DO YOU HAVE A DRINK CONTAINING ALCOHOL: NEVER
AUDIT-C TOTAL SCORE: 0
HOW OFTEN DO YOU HAVE 6 OR MORE DRINKS ON ONE OCCASION: NEVER
SKIP TO QUESTIONS 9-10: 1

## 2024-11-15 ASSESSMENT — PAIN SCALES - GENERAL
PAINLEVEL_OUTOF10: 7
PAINLEVEL_OUTOF10: 0 - NO PAIN
PAINLEVEL_OUTOF10: 8
PAINLEVEL_OUTOF10: 8

## 2024-11-15 ASSESSMENT — PAIN DESCRIPTION - LOCATION
LOCATION: NECK
LOCATION: CHEST

## 2024-11-15 ASSESSMENT — HEART SCORE
ECG: NORMAL
RISK FACTORS: >2 RISK FACTORS OR HX OF ATHEROSCLEROTIC DISEASE
HEART SCORE: 5
AGE: 45-64
HISTORY: HIGHLY SUSPICIOUS
TROPONIN: LESS THAN OR EQUAL TO NORMAL LIMIT

## 2024-11-15 ASSESSMENT — PAIN DESCRIPTION - DESCRIPTORS
DESCRIPTORS: CRUSHING
DESCRIPTORS: PRESSURE

## 2024-11-15 ASSESSMENT — COLUMBIA-SUICIDE SEVERITY RATING SCALE - C-SSRS
1. IN THE PAST MONTH, HAVE YOU WISHED YOU WERE DEAD OR WISHED YOU COULD GO TO SLEEP AND NOT WAKE UP?: NO
6. HAVE YOU EVER DONE ANYTHING, STARTED TO DO ANYTHING, OR PREPARED TO DO ANYTHING TO END YOUR LIFE?: NO
2. HAVE YOU ACTUALLY HAD ANY THOUGHTS OF KILLING YOURSELF?: NO

## 2024-11-15 ASSESSMENT — PAIN DESCRIPTION - ORIENTATION: ORIENTATION: LEFT

## 2024-11-15 ASSESSMENT — PAIN - FUNCTIONAL ASSESSMENT
PAIN_FUNCTIONAL_ASSESSMENT: 0-10
PAIN_FUNCTIONAL_ASSESSMENT: 0-10

## 2024-11-15 NOTE — ED TRIAGE NOTES
PT STATES THAT SHE STARTED FEELING SOME BACK PAIN WHILST LEAVING WORK YESTERDAY, WOKE UP TODAY WITH LEFT NECK PAIN RADIATING UNDER HER ARM AND BREAST, PT HAS BEEN HAVING TROUBLE WALKING OR STAND UP STRAIGHT, ALSO HAVING SOME SLIGHT CHEST AND SOB

## 2024-11-15 NOTE — ED PROVIDER NOTES
HPI   Chief Complaint   Patient presents with    LEFT NECK,  ARM AND BREAST PAIN       59-year-old female with history of hypertension, hyperlipidemia, longstanding smoking history who is presenting with crushing chest pain that radiates to the back.  Some radiation down the left arm.  No personal history of heart attacks.  No personal history of blood clots.  No nausea, vomiting, diaphoresis, shortness of breath.  No leg pain or swelling.  No history of aortic pathology.  States the pain does come underneath the left armpit as well, no rashes.  Has not had provocative cardiac testing, did have a CT coronary that was abnormal.      History provided by:  Patient          Patient History   Past Medical History:   Diagnosis Date    Acute candidiasis of vulva and vagina 2015    Vaginal yeast infection    Personal history of other diseases of the female genital tract 2021    History of postmenopausal bleeding    Personal history of other diseases of urinary system 2015    History of hematuria    Personal history of other medical treatment 10/21/2019    History of screening mammography     Past Surgical History:   Procedure Laterality Date    COLONOSCOPY  2017    Complete Colonoscopy    COLONOSCOPY  2014    rpt 7-24    OTHER SURGICAL HISTORY  2015    Biopsy Thyroid Using Percutaneous Core Needle    OTHER SURGICAL HISTORY  07/15/2020    Thyroidectomy total     Family History   Problem Relation Name Age of Onset    Coronary artery disease Mother      Diabetes Sister      Breast cancer Mother's Sister  60        x2 aunts    Throat cancer Mother's Brother      Colon cancer Other Maternal Cousin      Social History     Tobacco Use    Smoking status: Some Days     Current packs/day: 0.00     Types: Cigarettes     Last attempt to quit:      Years since quittin.8    Smokeless tobacco: Never   Vaping Use    Vaping status: Not on file   Substance Use Topics    Alcohol use: Not Currently     Drug use: Never       Physical Exam   ED Triage Vitals [11/15/24 1713]   Temperature Heart Rate Respirations BP   36.5 °C (97.7 °F) 66 18 (!) 147/91      Pulse Ox Temp src Heart Rate Source Patient Position   100 % -- -- --      BP Location FiO2 (%)     -- --       Physical Exam  Vitals and nursing note reviewed.   Constitutional:       General: She is in acute distress.      Appearance: She is well-developed. She is diaphoretic. She is not ill-appearing or toxic-appearing.   HENT:      Head: Normocephalic and atraumatic.      Mouth/Throat:      Mouth: Mucous membranes are moist.   Eyes:      Conjunctiva/sclera: Conjunctivae normal.   Cardiovascular:      Rate and Rhythm: Normal rate and regular rhythm.      Heart sounds: No murmur heard.  Pulmonary:      Effort: Pulmonary effort is normal. No respiratory distress.      Breath sounds: Normal breath sounds.   Abdominal:      Palpations: Abdomen is soft.      Tenderness: There is no abdominal tenderness.   Musculoskeletal:         General: No swelling or tenderness.      Cervical back: Neck supple.      Right lower leg: No edema.      Left lower leg: No edema.   Skin:     General: Skin is warm.      Capillary Refill: Capillary refill takes less than 2 seconds.   Neurological:      General: No focal deficit present.      Mental Status: She is alert and oriented to person, place, and time.   Psychiatric:         Mood and Affect: Mood normal.           ED Course & MDM   Diagnoses as of 11/16/24 1721   Chest pain, unspecified type                 No data recorded                                 Medical Decision Making  59-year-old female presenting with chest pain rating to the back concerning for acute coronary syndrome, aortic dissection, potential PE.  EKG shows normal sinus rhythm with no ST segment elevations or depressions, no T wave inversions, normal axis.  Will obtain labs, troponin, CT angiogram of the chest, abdomen, pelvis.  I am unable to elicit any pain or  tenderness when moving her arm or pushing on her chest wall or muscles, I have low suspicion for musculoskeletal pain at this time.    Troponins are negative, CT angiogram does not show dissection or pulmonary embolism.  She does continue to have pain, I gave aspirin.  Given she has a heart score of 5 based on story and risk factors, and has not had provocative testing, will admit for chest pain workup.  She is accepted to obs.        Procedure  Procedures     Mateo Hawley MD  11/16/24 8303

## 2024-11-16 ENCOUNTER — PHARMACY VISIT (OUTPATIENT)
Dept: PHARMACY | Facility: CLINIC | Age: 59
End: 2024-11-16
Payer: COMMERCIAL

## 2024-11-16 VITALS
DIASTOLIC BLOOD PRESSURE: 69 MMHG | WEIGHT: 193 LBS | OXYGEN SATURATION: 96 % | SYSTOLIC BLOOD PRESSURE: 136 MMHG | RESPIRATION RATE: 18 BRPM | TEMPERATURE: 97.2 F | HEART RATE: 62 BPM | BODY MASS INDEX: 36.44 KG/M2 | HEIGHT: 61 IN

## 2024-11-16 LAB
ALBUMIN SERPL BCP-MCNC: 4.1 G/DL (ref 3.4–5)
ALP SERPL-CCNC: 66 U/L (ref 33–136)
ALT SERPL W P-5'-P-CCNC: 21 U/L (ref 7–45)
ANION GAP SERPL CALC-SCNC: 10 MMOL/L (ref 10–20)
AST SERPL W P-5'-P-CCNC: 13 U/L (ref 9–39)
BILIRUB SERPL-MCNC: 0.5 MG/DL (ref 0–1.2)
BUN SERPL-MCNC: 20 MG/DL (ref 6–23)
CALCIUM SERPL-MCNC: 9.2 MG/DL (ref 8.6–10.3)
CHLORIDE SERPL-SCNC: 108 MMOL/L (ref 98–107)
CO2 SERPL-SCNC: 26 MMOL/L (ref 21–32)
CREAT SERPL-MCNC: 0.74 MG/DL (ref 0.5–1.05)
EGFRCR SERPLBLD CKD-EPI 2021: >90 ML/MIN/1.73M*2
ERYTHROCYTE [DISTWIDTH] IN BLOOD BY AUTOMATED COUNT: 15.6 % (ref 11.5–14.5)
EST. AVERAGE GLUCOSE BLD GHB EST-MCNC: 131 MG/DL
GLUCOSE SERPL-MCNC: 93 MG/DL (ref 74–99)
HBA1C MFR BLD: 6.2 %
HCT VFR BLD AUTO: 38.5 % (ref 36–46)
HGB BLD-MCNC: 12.5 G/DL (ref 12–16)
MCH RBC QN AUTO: 28.8 PG (ref 26–34)
MCHC RBC AUTO-ENTMCNC: 32.5 G/DL (ref 32–36)
MCV RBC AUTO: 89 FL (ref 80–100)
NRBC BLD-RTO: 0 /100 WBCS (ref 0–0)
PLATELET # BLD AUTO: 355 X10*3/UL (ref 150–450)
POTASSIUM SERPL-SCNC: 4.3 MMOL/L (ref 3.5–5.3)
PROT SERPL-MCNC: 6.4 G/DL (ref 6.4–8.2)
RBC # BLD AUTO: 4.34 X10*6/UL (ref 4–5.2)
SODIUM SERPL-SCNC: 140 MMOL/L (ref 136–145)
T4 FREE SERPL-MCNC: 0.9 NG/DL (ref 0.61–1.12)
WBC # BLD AUTO: 7.6 X10*3/UL (ref 4.4–11.3)

## 2024-11-16 PROCEDURE — 2500000001 HC RX 250 WO HCPCS SELF ADMINISTERED DRUGS (ALT 637 FOR MEDICARE OP): Performed by: NURSE PRACTITIONER

## 2024-11-16 PROCEDURE — 2500000001 HC RX 250 WO HCPCS SELF ADMINISTERED DRUGS (ALT 637 FOR MEDICARE OP): Performed by: PHARMACIST

## 2024-11-16 PROCEDURE — G0378 HOSPITAL OBSERVATION PER HR: HCPCS

## 2024-11-16 PROCEDURE — 84439 ASSAY OF FREE THYROXINE: CPT

## 2024-11-16 PROCEDURE — RXMED WILLOW AMBULATORY MEDICATION CHARGE

## 2024-11-16 PROCEDURE — 99254 IP/OBS CNSLTJ NEW/EST MOD 60: CPT | Performed by: INTERNAL MEDICINE

## 2024-11-16 PROCEDURE — 85027 COMPLETE CBC AUTOMATED: CPT

## 2024-11-16 PROCEDURE — 2500000001 HC RX 250 WO HCPCS SELF ADMINISTERED DRUGS (ALT 637 FOR MEDICARE OP): Performed by: STUDENT IN AN ORGANIZED HEALTH CARE EDUCATION/TRAINING PROGRAM

## 2024-11-16 PROCEDURE — 99239 HOSP IP/OBS DSCHRG MGMT >30: CPT

## 2024-11-16 PROCEDURE — 80053 COMPREHEN METABOLIC PANEL: CPT

## 2024-11-16 PROCEDURE — 2500000002 HC RX 250 W HCPCS SELF ADMINISTERED DRUGS (ALT 637 FOR MEDICARE OP, ALT 636 FOR OP/ED): Performed by: NURSE PRACTITIONER

## 2024-11-16 PROCEDURE — 36415 COLL VENOUS BLD VENIPUNCTURE: CPT

## 2024-11-16 RX ORDER — LISINOPRIL 20 MG/1
40 TABLET ORAL ONCE
Status: DISCONTINUED | OUTPATIENT
Start: 2024-11-16 | End: 2024-11-16

## 2024-11-16 RX ORDER — DICLOFENAC SODIUM 10 MG/G
4 GEL TOPICAL 4 TIMES DAILY
Qty: 100 G | Refills: 0 | Status: SHIPPED | OUTPATIENT
Start: 2024-11-16 | End: 2024-11-23

## 2024-11-16 RX ORDER — LIDOCAINE 50 MG/G
1 PATCH TOPICAL DAILY
Qty: 30 PATCH | Refills: 0 | Status: SHIPPED | OUTPATIENT
Start: 2024-11-16 | End: 2024-12-16

## 2024-11-16 RX ORDER — NICOTINE 7MG/24HR
1 PATCH, TRANSDERMAL 24 HOURS TRANSDERMAL EVERY 24 HOURS
Qty: 15 PATCH | Refills: 0 | Status: SHIPPED | OUTPATIENT
Start: 2024-11-16 | End: 2024-12-01

## 2024-11-16 RX ORDER — ACETAMINOPHEN 325 MG/1
650 TABLET ORAL EVERY 4 HOURS PRN
Start: 2024-11-16

## 2024-11-16 RX ORDER — LISINOPRIL 20 MG/1
40 TABLET ORAL ONCE
Status: COMPLETED | OUTPATIENT
Start: 2024-11-16 | End: 2024-11-16

## 2024-11-16 RX ORDER — KETOROLAC TROMETHAMINE 10 MG/1
10 TABLET, FILM COATED ORAL EVERY 6 HOURS PRN
Qty: 12 TABLET | Refills: 0 | Status: SHIPPED | OUTPATIENT
Start: 2024-11-16 | End: 2024-11-19

## 2024-11-16 RX ORDER — ASPIRIN 81 MG/1
81 TABLET ORAL DAILY
Start: 2024-11-17

## 2024-11-16 RX ADMIN — LISINOPRIL 40 MG: 20 TABLET ORAL at 11:54

## 2024-11-16 RX ADMIN — LEVOTHYROXINE SODIUM 137 MCG: 0.14 TABLET ORAL at 06:29

## 2024-11-16 RX ADMIN — ASPIRIN 81 MG: 81 TABLET, COATED ORAL at 11:54

## 2024-11-16 RX ADMIN — ACETAMINOPHEN 650 MG: 325 TABLET, FILM COATED ORAL at 11:58

## 2024-11-16 ASSESSMENT — PAIN SCALES - GENERAL
PAINLEVEL_OUTOF10: 5 - MODERATE PAIN
PAINLEVEL_OUTOF10: 0 - NO PAIN

## 2024-11-16 ASSESSMENT — PAIN DESCRIPTION - LOCATION: LOCATION: BACK

## 2024-11-16 ASSESSMENT — PAIN SCALES - PAIN ASSESSMENT IN ADVANCED DEMENTIA (PAINAD): TOTALSCORE: MEDICATION (SEE MAR);HEAT APPLIED

## 2024-11-16 ASSESSMENT — PAIN SCALES - WONG BAKER: WONGBAKER_NUMERICALRESPONSE: NO HURT

## 2024-11-16 NOTE — H&P
History of present illness:  This is a 59 y.o. female with PMH of HTN, HLD, CAD, elevated CAD score (325) with plans for op stress, mild aortic regurgitation, nicotine dependence, glanular cell tumor of L lung, emphysema, hx of thyroid cancer, hypothyroidism, vit d deficency, obesity, and OA.    Presents to [facility] ED on 11/15/2024 for crushing chest pain that radiates to the back.    Per patient, pain initially began in back as cramping pressure while at  [employer], where she works, where she sits at a desk for most of the day.  Upon leaving work and starting her drive home, can pain in her back began to worsen.  She also endorses a brief period of blurry vision that did not sustain and has since resolved.  When she got home, the pain in her back was so excruciating she had to lay down and go to sleep.    The following morning, the back pain was 8/10 and was radiating to her L neck, across her L breast, and down her L arm.  She endorses SOB secondary to pain, denies HA, dizziness, diaphoresis.  The pain is worse when she walks, although it is hard to determine if it is exertional or positional.  The pain got so excruciating that she presented to ed.    In the ED /91 and HDS.  HsT 3->2, EKG without acute ischemic changes.  CTA chest negative for PE, coronary artery calcifications and emphysematous changes with bibasilar atelectasis.  Incidental nodular densities in both breasts that are unchanged from previous studies.  She received 324 mg ASA, dilaudid IV, toradol 15 mg IVP, carafate, and was admitted for CP work up.      At the bedside she is laying flat.  She states she still has the crampy back in in the LUQ of her back, that is mild and reproducible.  When she sits up straight in bed, she states that the radiating pain to the L neck and L breast act up again.  When she lays down, those pains resolve.  All of her pain was relieved by pain meds in the ED.    Upon assessing her, it is felt that this  pain is more skeletal-muscle in nature.  Cardiology evaluated her, and cleared her for safe DC without acute intervention.  We will DC her with pain medications for skeletal muscle pain, with FU with her PCP.    Past Medical History:  2015: Acute candidiasis of vulva and vagina      Comment:  Vaginal yeast infection  2021: Personal history of other diseases of the female genital   tract      Comment:  History of postmenopausal bleeding  2015: Personal history of other diseases of urinary system      Comment:  History of hematuria  10/21/2019: Personal history of other medical treatment      Comment:  History of screening mammography     Review of Systems   Constitutional:  Negative for activity change, appetite change, chills, diaphoresis, fatigue, fever and unexpected weight change.   Respiratory:  Positive for chest tightness and shortness of breath. Negative for apnea, cough, choking, wheezing and stridor.    Cardiovascular:  Positive for chest pain. Negative for palpitations and leg swelling.   Musculoskeletal:  Positive for back pain and neck pain. Negative for gait problem and neck stiffness.   Neurological:  Negative for dizziness, tremors, seizures, syncope, facial asymmetry, speech difficulty, weakness, light-headedness, numbness and headaches.     Surgical History:  She has a past surgical history that includes Other surgical history (2015); Colonoscopy (2017); Other surgical history (07/15/2020); and Colonoscopy (2014).    Social History     Socioeconomic History    Marital status: Single   Tobacco Use    Smoking status: Some Days     Current packs/day: 0.00     Types: Cigarettes     Last attempt to quit:      Years since quittin.8    Smokeless tobacco: Never   Substance and Sexual Activity    Alcohol use: Not Currently    Drug use: Never     Social Drivers of Health     Financial Resource Strain: Low Risk  (11/15/2024)    Overall Financial Resource Strain (CARDIA)      Difficulty of Paying Living Expenses: Not hard at all   Food Insecurity: No Food Insecurity (11/15/2024)    Hunger Vital Sign     Worried About Running Out of Food in the Last Year: Never true     Ran Out of Food in the Last Year: Never true   Transportation Needs: No Transportation Needs (11/15/2024)    PRAPARE - Transportation     Lack of Transportation (Medical): No     Lack of Transportation (Non-Medical): No   Intimate Partner Violence: Not At Risk (11/15/2024)    Humiliation, Afraid, Rape, and Kick questionnaire     Fear of Current or Ex-Partner: No     Emotionally Abused: No     Physically Abused: No     Sexually Abused: No   Housing Stability: Low Risk  (11/15/2024)    Housing Stability Vital Sign     Unable to Pay for Housing in the Last Year: No     Number of Times Moved in the Last Year: 1     Homeless in the Last Year: No       Family History   Problem Relation Name Age of Onset    Coronary artery disease Mother      Diabetes Sister      Breast cancer Mother's Sister  60        x2 aunts    Throat cancer Mother's Brother      Colon cancer Other Maternal Cousin         Home meds:  Current Outpatient Medications   Medication Instructions    ezetimibe (ZETIA) 10 mg, oral, Daily    levothyroxine (SYNTHROID, LEVOXYL) 137 mcg, oral, Daily before breakfast    lisinopril 40 mg, oral, Daily    polyethylene glycol (GoLYTELY) 236-22.74-6.74 -5.86 gram solution Drink 1/2 starting at 6 pm the night before your procedure then drink the 2nd 1/2 5 hours before procedure arrival time    rosuvastatin (Crestor) 40 mg tablet TAKE 1 TABLET BY MOUTH ONCE DAILY    valACYclovir (Valtrex) 500 mg tablet TAKE 1 TABLET BY MOUTH ONCE DAILY    varenicline (Chantix ALETHEA) 0.5 mg (11)- 1 mg (42) tablet Days 1 to 3: 0.5 mg by mouth once a day. Days 4 to 7: 0.5 mg 2 times per day. Days 8 to end of treatment: 1 mg 2 times per day as directed on package.    Vitamin D2 50,000 Units, oral, Once Weekly     Vitals (Last 24 Hours):  Heart  "Rate:  [58-76]   Temp:  [35.8 °C (96.4 °F)-36.5 °C (97.7 °F)]   Resp:  [16-18]   BP: ()/(56-91)   Height:  [154.9 cm (5' 1\")]   Weight:  [87.5 kg (193 lb)]   SpO2:  [92 %-100 %]      PHYSICAL EXAM:  Physical Exam  Constitutional:       General: She is awake.      Appearance: Normal appearance.   Cardiovascular:      Rate and Rhythm: Normal rate and regular rhythm.      Heart sounds: Murmur heard.      Comments: Systolic murmur heard at Aortic, pulmonic, and tricuspid region.   Pulmonary:      Effort: Pulmonary effort is normal.      Breath sounds: Normal breath sounds.   Abdominal:      General: Abdomen is flat. Bowel sounds are normal. There is no distension.      Palpations: Abdomen is soft.      Tenderness: There is no abdominal tenderness.   Skin:     General: Skin is warm and dry.      Capillary Refill: Capillary refill takes less than 2 seconds.   Neurological:      General: No focal deficit present.      Mental Status: She is alert and oriented to person, place, and time. Mental status is at baseline.   Psychiatric:         Attention and Perception: Attention and perception normal.         Mood and Affect: Mood and affect normal.         Speech: Speech normal.         Behavior: Behavior is cooperative.         Thought Content: Thought content normal.         Cognition and Memory: Cognition and memory normal.         Judgment: Judgment normal.     Results for orders placed or performed during the hospital encounter of 11/15/24 (from the past 24 hours)   CBC and Auto Differential   Result Value Ref Range    WBC 9.8 4.4 - 11.3 x10*3/uL    nRBC 0.0 0.0 - 0.0 /100 WBCs    RBC 4.45 4.00 - 5.20 x10*6/uL    Hemoglobin 12.8 12.0 - 16.0 g/dL    Hematocrit 38.7 36.0 - 46.0 %    MCV 87 80 - 100 fL    MCH 28.8 26.0 - 34.0 pg    MCHC 33.1 32.0 - 36.0 g/dL    RDW 15.4 (H) 11.5 - 14.5 %    Platelets 380 150 - 450 x10*3/uL    Neutrophils % 46.7 40.0 - 80.0 %    Immature Granulocytes %, Automated 0.3 0.0 - 0.9 %    " Lymphocytes % 46.1 13.0 - 44.0 %    Monocytes % 4.0 2.0 - 10.0 %    Eosinophils % 2.6 0.0 - 6.0 %    Basophils % 0.3 0.0 - 2.0 %    Neutrophils Absolute 4.58 1.20 - 7.70 x10*3/uL    Immature Granulocytes Absolute, Automated 0.03 0.00 - 0.70 x10*3/uL    Lymphocytes Absolute 4.52 1.20 - 4.80 x10*3/uL    Monocytes Absolute 0.39 0.10 - 1.00 x10*3/uL    Eosinophils Absolute 0.25 0.00 - 0.70 x10*3/uL    Basophils Absolute 0.03 0.00 - 0.10 x10*3/uL   Comprehensive metabolic panel   Result Value Ref Range    Glucose 98 74 - 99 mg/dL    Sodium 138 136 - 145 mmol/L    Potassium 4.0 3.5 - 5.3 mmol/L    Chloride 107 98 - 107 mmol/L    Bicarbonate 24 21 - 32 mmol/L    Anion Gap 11 10 - 20 mmol/L    Urea Nitrogen 23 6 - 23 mg/dL    Creatinine 0.82 0.50 - 1.05 mg/dL    eGFR 81 >60 mL/min/1.73m*2    Calcium 8.9 8.6 - 10.3 mg/dL    Albumin 4.2 3.4 - 5.0 g/dL    Alkaline Phosphatase 65 33 - 136 U/L    Total Protein 7.2 6.4 - 8.2 g/dL    AST 15 9 - 39 U/L    Bilirubin, Total 0.3 0.0 - 1.2 mg/dL    ALT 25 7 - 45 U/L   B-Type Natriuretic Peptide   Result Value Ref Range    BNP 12 0 - 99 pg/mL   Troponin I, High Sensitivity, Initial   Result Value Ref Range    Troponin I, High Sensitivity 3 0 - 13 ng/L   Lipase   Result Value Ref Range    Lipase 31 9 - 82 U/L   Hemoglobin A1C   Result Value Ref Range    Hemoglobin A1C 6.2 (H) See comment %    Estimated Average Glucose 131 Not Established mg/dL   Troponin, High Sensitivity, 1 Hour   Result Value Ref Range    Troponin I, High Sensitivity 3 0 - 13 ng/L   TSH   Result Value Ref Range    Thyroid Stimulating Hormone 5.08 (H) 0.44 - 3.98 mIU/L   Lipid panel   Result Value Ref Range    Cholesterol 223 (H) 0 - 199 mg/dL    HDL-Cholesterol 39.9 mg/dL    Cholesterol/HDL Ratio 5.6     LDL Calculated 156 (H) <=99 mg/dL    VLDL 27 0 - 40 mg/dL    Triglycerides 135 0 - 149 mg/dL    Non HDL Cholesterol 183 (H) 0 - 149 mg/dL   CBC   Result Value Ref Range    WBC 7.6 4.4 - 11.3 x10*3/uL    nRBC 0.0 0.0 -  0.0 /100 WBCs    RBC 4.34 4.00 - 5.20 x10*6/uL    Hemoglobin 12.5 12.0 - 16.0 g/dL    Hematocrit 38.5 36.0 - 46.0 %    MCV 89 80 - 100 fL    MCH 28.8 26.0 - 34.0 pg    MCHC 32.5 32.0 - 36.0 g/dL    RDW 15.6 (H) 11.5 - 14.5 %    Platelets 355 150 - 450 x10*3/uL   Comprehensive metabolic panel   Result Value Ref Range    Glucose 93 74 - 99 mg/dL    Sodium 140 136 - 145 mmol/L    Potassium 4.3 3.5 - 5.3 mmol/L    Chloride 108 (H) 98 - 107 mmol/L    Bicarbonate 26 21 - 32 mmol/L    Anion Gap 10 10 - 20 mmol/L    Urea Nitrogen 20 6 - 23 mg/dL    Creatinine 0.74 0.50 - 1.05 mg/dL    eGFR >90 >60 mL/min/1.73m*2    Calcium 9.2 8.6 - 10.3 mg/dL    Albumin 4.1 3.4 - 5.0 g/dL    Alkaline Phosphatase 66 33 - 136 U/L    Total Protein 6.4 6.4 - 8.2 g/dL    AST 13 9 - 39 U/L    Bilirubin, Total 0.5 0.0 - 1.2 mg/dL    ALT 21 7 - 45 U/L     MEDS:  aspirin, 81 mg, oral, Daily  enoxaparin, 40 mg, subcutaneous, q24h  ezetimibe, 10 mg, oral, Daily  levothyroxine, 137 mcg, oral, Daily before breakfast  lisinopril, 40 mg, oral, Daily  lisinopril, 40 mg, oral, Once  rosuvastatin, 40 mg, oral, Nightly      PRN medications: acetaminophen **OR** acetaminophen **OR** acetaminophen, ondansetron **OR** ondansetron, polyethylene glycol      I have reviewed all imaging reports and labs pertinent to this visit    ASSESSMENT/PLAN:    Chest pain  -Crushing, radiating to back  -HST negative x 2, EKG no acute ischemic changes  -Was started on asa  -A1c 6.2  -lipid panel with cholesterol 223, ldl 156, non hdl 183  -Cardiology consulted  -per reported HPI, appears to be more SKM  Plan:  -Continue asa/statin/zetia  -monitor on tele  -cardiology consultation  :OP stress test upon DC.  She has one scheduled for January, cardiology has encouraged her to keep that schedule   :DC on home medications   :FU with PCP upon DC   :No cardiac barriers, cleared for DC    HTN  HLD  -See lipid panels above  -BP acceptable at this time  -Continue home lisinopril and  rosuvastatin      Multinodular goiter now s/p total tx with surgical hypothyroidism   -TSH 5.08, T4 .91, sublicinial hypothyroidism  -continue Synthroid, FU with PCP OP upon DC for any future monitoring     Nicotine dependence   Emphysema  Granular cell tumor L lung  -Encourage smoking cessation  -Will place referral for FU with her PCP upon DC to see if any interventions or monitoring needs to be done.    Incidental Breast Nodules on imaging  -seen on prior imaging.  -Appears Dr. Gonzales sent referral for ultrasound to further evaluate     GI/VTE PPX: PPI, SQ lovenox  Code Status: No Order    DC Disposition:   -Discharge home once medically cleared and stable, pending cardiology evaluation and clearance     Other comorbidities as above  -continue medications as ordered and adjust based on clinical course     Discussed with Dr. Beatris Torres and the interdisciplinary team     ALEKSEY Cody-CNP

## 2024-11-16 NOTE — DISCHARGE INSTRUCTIONS
You are being discharged on a pain medication called Toradol.  This is a NSAID medication that decreases inflammation.  It is in the same drug class as Motrin/Ibuprofen/Advil.  It is not recommended to take this medication for more than several days, as it increases risk of you developing a bleed in your stomach, or having an injury to your kidneys.  You are not to take any other medications in the NSAID class while taking this medication, and you are not to take this medication for more than 3 days as prescribed.  I want you to follow up with your PCP within 7-10 days after you discharge to make sure that your back pain has improved, and that your kidney function is still at your baseline.

## 2024-11-16 NOTE — CARE PLAN
The patient's goals for the shift include      The clinical goals for the shift include Maintain comfort and safety throughout shift

## 2024-11-16 NOTE — CARE PLAN
The clinical goals for the shift include remain free from recurrence of chest pain    Problem: Pain - Adult  Goal: Verbalizes/displays adequate comfort level or baseline comfort level  Outcome: Progressing     Problem: Safety - Adult  Goal: Free from fall injury  Outcome: Progressing     Problem: Discharge Planning  Goal: Discharge to home or other facility with appropriate resources  Outcome: Progressing     Problem: Chronic Conditions and Co-morbidities  Goal: Patient's chronic conditions and co-morbidity symptoms are monitored and maintained or improved  Outcome: Progressing

## 2024-11-16 NOTE — CONSULTS
"Inpatient consult to Cardiology  Consult performed by: LANDY Vargas  Consult ordered by: LANDY Jackson  Reason for consult: chest pain      History Of Present Illness:    Vinh Haq \"VINH URIARTE" is a 59 y.o. 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  Cardiology was consulted for \"chest pain\"    Patient claims on Thursday when driving home from work she developed a sharp pain in the middle of her back which radiated up her left neck and down her left arm wrapping around to her left side.  She also had some blurry vision driving home from work patient claims she pulled to the side of the road and eventually resolved.  She slept with a heating pad on her back the next night pain still persisted she decided to come to the emergency room.  She is currently pain-free she got some pain medication overnight which she claims helped her pain.  Prior to this event she denies any nausea, vomiting, lightheadedness, shortness of breath, dyspnea on exertion and endorses taking all her Medications.  2 weeks ago she did have a colonoscopy.  She follows with a PCP.  She denies following with cardiologist.     Afebrile, heart rate 63, blood pressure 113/70, 94% on room air.  Notable labs on admission BUNs/CR 23/0.82, potassium 4.0, troponin high-sensitivity negative x 2, BNP 12, TSH 5.08, hemoglobin A1c 6.2, H&H 12.8/38.7, CT angio of the chest abdomen pelvis showed no evidence for aneurysmal dilatation or dissection involving the thoracic aorta. Coronary artery calcifications. Emphysematous changes, with bibasilar atelectasis. Incidental nodular densities in both breasts.  These are unchanged from prior studies No evidence of aneurysmal dilatation or dissection involving the abdominal aorta.  In the ER patient got 81 mg of aspirin, started on her " home meds, Dilaudid IV push, Toradol IV push, Carafate, and aspirin 324 mg.      Home cardiac medications include Crestor 40 mg daily, lisinopril 40 mg, and ezetamide 10 mg daily and ASA 81mg daily     Admit EKG sinus rhythm with no acute signs of ischemia          Past Cardiology Tests (Last 3 Years):  10/2024  Coronary calcium score  The score and distribution of calcium in the coronary arteries is as  follows:      LM 0,  .1,  LCx 2.2,  RCA 90.5,      Total 325.8    12/2020 Stress Test   1. The resting ejection fraction was estimated at 60 to 65% with a peak exercise ejection fraction estimated at >75%.   2. Normal global left ventricular systolic function.   3. No clinical, electrocardiographic or echocardiographic evidence for ischemia at a maximal workload.   4. Normal Stress Test.   5. The adequate level of stress was achieved.     TTE 3/2020  1. The left ventricular systolic function is normal with a 55-60% estimated ejection fraction.   2. Spectral Doppler shows an impaired relaxation pattern of left ventricular diastolic filling.   3. RVSP within normal limits.   4. There is mild aortic valve regurgitation.  Past Medical History:  She has a past medical history of Acute candidiasis of vulva and vagina (11/30/2015), Personal history of other diseases of the female genital tract (11/12/2021), Personal history of other diseases of urinary system (02/16/2015), and Personal history of other medical treatment (10/21/2019).    Past Surgical History:  She has a past surgical history that includes Other surgical history (02/16/2015); Colonoscopy (07/11/2017); Other surgical history (07/15/2020); and Colonoscopy (07/2014).      Social History:  She reports that she has been smoking cigarettes. She has never used smokeless tobacco. She reports that she does not currently use alcohol. She reports that she does not use drugs.    Family History:  Family History   Problem Relation Name Age of Onset    Coronary  "artery disease Mother      Diabetes Sister      Breast cancer Mother's Sister  60        x2 aunts    Throat cancer Mother's Brother      Colon cancer Other Maternal Cousin         Allergies:  Patient has no known allergies.    ROS:  10 point review of systems including (Constitutional, Eyes, ENMT, Respiratory, Cardiac, Gastrointestinal, Neurological, Psychiatric, and Hematologic) was performed and is otherwise negative.    Objective Data:  Last Recorded Vitals:  Vitals:    11/15/24 2215 11/15/24 2249 24 0014 24 0352   BP:  122/66 128/70 113/70   BP Location:   Right arm Right arm   Patient Position:   Lying Lying   Pulse: 64 65 69 63   Resp:  18 18 18   Temp:  35.8 °C (96.4 °F) 36 °C (96.8 °F) 36.2 °C (97.2 °F)   TempSrc:  Temporal Temporal Temporal   SpO2: (!) 93% 93% 93% 94%   Weight:       Height:            Weight  Av.5 kg (193 lb)  Min: 87.5 kg (193 lb)  Max: 87.5 kg (193 lb)      LABS:  CMP:  Results from last 7 days   Lab Units 11/15/24  1740   SODIUM mmol/L 138   POTASSIUM mmol/L 4.0   CHLORIDE mmol/L 107   CO2 mmol/L 24   ANION GAP mmol/L 11   BUN mg/dL 23   CREATININE mg/dL 0.82   EGFR mL/min/1.73m*2 81   ALBUMIN g/dL 4.2   ALT U/L 25   AST U/L 15   BILIRUBIN TOTAL mg/dL 0.3   LIPASE U/L 31     CBC:  Results from last 7 days   Lab Units 11/15/24  1740   WBC AUTO x10*3/uL 9.8   HEMOGLOBIN g/dL 12.8   HEMATOCRIT % 38.7   PLATELETS AUTO x10*3/uL 380   MCV fL 87     COAG:     ABO: No results found for: \"ABO\"  HEME/ENDO:  Results from last 7 days   Lab Units 11/15/24  1933 11/15/24  1740   TSH mIU/L 5.08*  --    HEMOGLOBIN A1C %  --  6.2*      CARDIAC:   Results from last 7 days   Lab Units 11/15/24  1933 11/15/24  1740   TROPHS ng/L 3 3   BNP pg/mL  --  12      Results from last 7 days   Lab Units 11/15/24  1933 11/15/24  1740   HEMOGLOBIN A1C %  --  6.2*   LDL CALC mg/dL 156*  --    VLDL mg/dL 27  --    CHOLESTEROL/HDL RATIO  5.6  --         Last I/O:  No intake or output data in the 24 hours " ending 11/16/24 0657  Net IO Since Admission: No IO data has been entered for this period [11/16/24 0657]      Imaging Results:  CT angio chest abdomen pelvis    Result Date: 11/15/2024  STUDY: CT Angiogram of the Chest, Abdomen, and Pelvis; 11/15/2024 6:57 PM INDICATION: Crushing chest pain radiating to back, assess for dissection. COMPARISON: CT coronary calcium evaluation 10/18/2024.  CT chest 05/09/2024, 06/15/2023, 03/02/2023, 03/07/2022. ACCESSION NUMBER(S): [id number] ORDERING CLINICIAN: KENNY ESQUIVEL TECHNIQUE:  Helical CT is performed from the lung apices through the symphysis pubis after bolus administration of 90 mL of Omnipaque-350. Images are reviewed and processed at a workstation according to the CT angiogram protocol with 3-D and/or MIP post processing imaging generated. Automated mA/kV exposure control was utilized and patient examination was performed in strict accordance with principles of ALARA. FINDINGS:  Emphysematous changes are seen within the lungs.  There is subsegmental atelectasis in the lower lobes bilaterally, as well as the lingula.  No pleural effusions are visualized.  There is no evidence of a pneumothorax.  No filling defects are seen within the trachea or mainstem bronchi.  No enlarged lymph as are seen in the mediastinal or hilar regions.  There is no pericardial effusion. Coronary artery calcifications are present.  No filling defects are seen in the central pulmonary arteries.  There is no evidence of aneurysmal dilatation of the ascending aorta, aortic arch, or descending thoracic aorta.  No intimal flap is visualized to indicate a dissection.  Degenerative changes are seen throughout the thoracic spine.  There is a nodular density in the medial right breast measured at 2.0 x 2.8 cm(Series 301, Image 259).  There is a nodule in the central left breast, measured at 8 x 8 mm(Series 301, Image 276). These are unchanged from prior studies. No acute findings are seen within the  liver, spleen, pancreas, or adrenal glands.  The gallbladder is mildly distended.  No acute findings are seen within either kidney.  No dilated loops of small bowel or colon are visualized.  There is no evidence for free intraperitoneal air.  The appendix is normal in appearance.  No enlarged lymph nodes are seen within the pelvic sidewalls, iliac chains, or retroperitoneum.  No prominent edema is seen within the psoas musculature.  No compression deformities are visualized within the lumbar spine.  There is no evidence of aneurysmal dilatation of the abdominal aorta.  No intimal flap is visualized to indicate a dissection.  Plaque is seen throughout the common iliac arteries, with no significant stenoses.  The external iliac and common femoral arteries are patent bilaterally.  The celiac, superior mesenteric, and inferior mesenteric arteries are all patent.  There are single renal arteries present bilaterally, both of which are patent.    CHEST: 1.  No evidence for aneurysmal dilatation or dissection involving the thoracic aorta. 2.  Coronary artery calcifications. 3.  Emphysematous changes, with bibasilar atelectasis. 4.  Incidental nodular densities in both breasts.  These are unchanged from prior studies.  Correlation with prior mammography is recommended. ABDOMEN/PELVIS: 1.  No evidence of aneurysmal dilatation or dissection involving the abdominal aorta. Signed by Glenn Christopher MD      Inpatient Medications:  Scheduled medications   Medication Dose Route Frequency    aspirin  81 mg oral Daily    enoxaparin  40 mg subcutaneous q24h    ezetimibe  10 mg oral Daily    levothyroxine  137 mcg oral Daily before breakfast    lisinopril  40 mg oral Daily    rosuvastatin  40 mg oral Nightly     PRN medications   Medication    acetaminophen    Or    acetaminophen    Or    acetaminophen    ondansetron    Or    ondansetron    polyethylene glycol     Continuous Medications   Medication Dose Last Rate       Outpatient  "Medications:  Current Outpatient Medications   Medication Instructions    ezetimibe (ZETIA) 10 mg, oral, Daily    levothyroxine (SYNTHROID, LEVOXYL) 137 mcg, oral, Daily before breakfast    lisinopril 40 mg, oral, Daily    polyethylene glycol (GoLYTELY) 236-22.74-6.74 -5.86 gram solution Drink 1/2 starting at 6 pm the night before your procedure then drink the 2nd 1/2 5 hours before procedure arrival time    rosuvastatin (Crestor) 40 mg tablet TAKE 1 TABLET BY MOUTH ONCE DAILY    valACYclovir (Valtrex) 500 mg tablet TAKE 1 TABLET BY MOUTH ONCE DAILY    varenicline (Chantix ALETHEA) 0.5 mg (11)- 1 mg (42) tablet Days 1 to 3: 0.5 mg by mouth once a day. Days 4 to 7: 0.5 mg 2 times per day. Days 8 to end of treatment: 1 mg 2 times per day as directed on package.    Vitamin D2 50,000 Units, oral, Once Weekly       Physical Exam:  General:  Patient is awake, alert, and oriented.  Patient is in no acute distress.  HEENT:  Pupils equal and reactive.  Normocephalic.  Moist mucosa.    Neck:  No thyromegaly.  Normal Jugular Venous Pressure.  Cardiovascular:  Regular rate and rhythm.  Normal S1 and S2.  Pulmonary:  Clear to auscultation bilaterally.  Abdomen:  Soft. Non-tender.   Non-distended.  Positive bowel sounds.  Lower Extremities:  2+ pedal pulses. No LE edema.  Neurologic:  Cranial nerves intact.  No focal deficit.   Skin: Skin warm and dry, normal skin turgor.   Psychiatric: Normal affect.     Assessment/Plan   Vinh Haq \"VINH SOSA\" is a 59 y.o. 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  Cardiology was consulted for \"chest pain\"     Coronary calcium score 10/2024  LM 0,  .1,  LCx 2.2,  RCA 90.5,  Total 325.8    Home cardiac medications include Crestor 40 mg daily, lisinopril 40 mg, and ezetamide 10 mg daily and ASA 81mg daily     #Likely non-cardiac chest " pain-high sensitive troponins negative x 2, EKG nonischemic, pain resolved with Toradol and Dilaudid.  #Hyperlipidemia  #Hypertension-controlled on admit      RECS:  -Patient does have elevated coronary calcium score; EKG and enzymes negative for ischemia we would recommend an outpatient stress test -> advised patient to keep with currently scheduled stress test for beginning of January  -Continue with home meds   -Follow-up as scheduled with PCP  -no cardiac barriers to discharge    Code Status:  Full Code    I spent 40 minutes in the professional and overall care of this patient.        ALEKSEY Vargas-CNP     STAFF ADDENDUM:    Both the JAVIER and I have had a face to face encounter with the patient today. I have examined the patient and edited the documented physical examination as necessary.  I personally reviewed the patient's vital signs, telemetry, recent labs, medications, orders, EKGs, and pertinent cardiac imaging/ echocardiography.  I have reviewed the JAVIER's encounter note, approve the JAVIER's documentation and have edited the note to reflect my diagnostic and therapeutic plan.      Presentation consistent with noncardiac chest pain.  Musculoskeletal.  Improved with NSAIDs and narcotics.  She has ruled out for MI with serial troponin.  CT chest negative for dissection.  No further cardiovascular workup recommended at this time.  Appropriate pain care per primary service.  Will sign off.    Maksim Beckwith DO

## 2024-11-16 NOTE — CARE PLAN
Problem: Pain - Adult  Goal: Verbalizes/displays adequate comfort level or baseline comfort level  11/15/2024 2338 by Tg Jiang RN  Flowsheets (Taken 11/15/2024 2338)  Verbalizes/displays adequate comfort level or baseline comfort level: Encourage patient to monitor pain and request assistance  11/15/2024 2336 by Tg Jiang RN  Outcome: Progressing

## 2024-11-16 NOTE — HOSPITAL COURSE
[facility] OBSERVATION H&P  HPI: Vinh Haq is a 59 y.o. female, with a PMH of HTN, HLD, CAD, elevated CAC score (325) with plans for op stress, mid aortic regurgitation, nicotine dependence with glanular cell tumor of left lung, emphysema, hx of thyroid cancer hypothyroidism, vitamin D deficiency, obesity, osteoarthritis, who presented to [facility] ED on 11/15/2024 for crushing chest pain radiating to back .      ED Course: /91 otherwise VSS. Labs largely unremarkable, HST 3->3. 2. EKG with no acute ischemic changes notes. CTA chest -negative for PE. Coronary artery calcifications.  Emphysematous changes, with bibasilar atelectasis. Incidental nodular densities in both breasts.  These are unchanged from prior studies.  Correlation with prior mammography is recommended. Received 324mg asa, dilaudid 0.5g IVP, Toradol 15mg IVP, Carafate     Patient History   PMH: She has a past medical history of Acute candidiasis of vulva and vagina (11/30/2015), Personal history of other diseases of the female genital tract (11/12/2021), Personal history of other diseases of urinary system (02/16/2015), and Personal history of other medical treatment (10/21/2019).  PSH: She has a past surgical history that includes Other surgical history (02/16/2015); Colonoscopy (07/11/2017); Other surgical history (07/15/2020); and Colonoscopy (07/2014).  SH: She reports that she has been smoking cigarettes. She has never used smokeless tobacco. She reports that she does not currently use alcohol. She reports that she does not use drugs.  FH: family history includes Breast cancer (age of onset: 60) in her mother's sister; Colon cancer in an other family member; Coronary artery disease in her mother; Diabetes in her sister; Throat cancer in her mother's brother.     No Known Allergies  Current Outpatient Medications   Medication Instructions    ezetimibe (ZETIA) 10 mg, oral, Daily    levothyroxine (SYNTHROID, LEVOXYL) 137 mcg, oral, Daily  "before breakfast    lisinopril 40 mg, oral, Daily    polyethylene glycol (GoLYTELY) 236-22.74-6.74 -5.86 gram solution Drink 1/2 starting at 6 pm the night before your procedure then drink the 2nd 1/2 5 hours before procedure arrival time    rosuvastatin (Crestor) 40 mg tablet TAKE 1 TABLET BY MOUTH ONCE DAILY    valACYclovir (Valtrex) 500 mg tablet TAKE 1 TABLET BY MOUTH ONCE DAILY    varenicline (Chantix ALETHEA) 0.5 mg (11)- 1 mg (42) tablet Days 1 to 3: 0.5 mg by mouth once a day. Days 4 to 7: 0.5 mg 2 times per day. Days 8 to end of treatment: 1 mg 2 times per day as directed on package.    Vitamin D2 50,000 Units, oral, Once Weekly     Review of Systems   ROS: 10-point review of systems was performed and is otherwise negative except as noted in HPI.        Heart Rate:  [58-76]   Temperature:  [36.5 °C (97.7 °F)]   Respirations:  [16-18]   BP: (101-147)/(59-91)   Height:  [154.9 cm (5' 1\")]   Weight:  [87.5 kg (193 lb)]   Pulse Ox:  [92 %-100 %]      0-10 (Numeric) Pain Score: 7   Vitals:    11/15/24 1713   Weight: 87.5 kg (193 lb)        Physical Exam:  Vitals and nursing notes reviewed.  GENERAL: Alert and awake, cooperative; in no acute distress  SKIN: Warm and dry, cap refill <2  HEENT: Normocephalic, PEERL, mucous membranes pink and moist  CARDIAC: Regular rate and rhythm, S1S2, no murmurs or abnormal heart sounds  CHEST: Normal respiratory effort, no abnormal breath sounds  ABDOMEN: soft, non-distended, non-tender with palpation  EXTREMITIES: No lower extremity edema, normal pulses all 4 extremities  NEURO: Alert and oriented, mental status at baseline, no focal deficits  PSYCH: Behavior and affect as expected     Medications  aspirin, 324 mg, oral, Once  ketorolac, 15 mg, intravenous, Once  sucralfate, 1 g, oral, Once         Diagnostic Results   CBC- 11/15/2024:  5:40 PM  9.8 12.8 380    38.7      BMP- 11/15/2024:  5:40 PM  138 23 _ 98   4.0 0.82 24    Estimated Creatinine Clearance: 72.5 mL/min (by C-G " formula based on SCr of 0.82 mg/dL).     CA: 8.9 PROTIEN: 7.2 ALT: 25 Total Bili: 0.3 Mg: _   PHOS: _ ALBUMIN: 4.2 AST: 15   Alk Phos: 65      COAGS- No results in last year.  _   _ _     CV Labs  Troponin I, High Sensitivity   Date/Time Value Ref Range Status   11/15/2024 07:33 PM 3 0 - 13 ng/L Final   11/15/2024 05:40 PM 3 0 - 13 ng/L Final     BNP   Date/Time Value Ref Range Status   11/15/2024 05:40 PM 12 0 - 99 pg/mL Final   03/14/2021 01:49 AM 8 0 - 99 pg/mL Final     Comment:     .  <100 pg/mL - Heart failure unlikely  100-299 pg/mL - Intermediate probability of acute heart  .               failure exacerbation. Correlate with clinical  .               context and patient history.    >=300 pg/mL - Heart Failure likely. Correlate with clinical  .               context and patient history.   Biotin interference may cause falsely decreased results.   Patients taking a Biotin dose of up to 5 mg/day should   refrain from taking Biotin for 24 hours before sample   collection. Providers may contact their local laboratory   for further information.       LDL Calculated   Date/Time Value Ref Range Status   07/31/2024 07:59  (H) <=99 mg/dL Final     Comment:                                 Near   Borderline      AGE      Desirable  Optimal    High     High     Very High     0-19 Y     0 - 109     ---    110-129   >/= 130     ----    20-24 Y     0 - 119     ---    120-159   >/= 160     ----      >24 Y     0 -  99   100-129  130-159   160-189     >/=190     06/05/2024 02:28 PM   Final     Comment:     The calculation of LDL and VLDL are inaccurate when the Triglycerides are greater than 400 mg/dL or when the patient is non-fasting. If LDL measurement is necessary contact the testing laboratory for an alternative LDL assay.                                  Near   Borderline      AGE      Desirable  Optimal    High     High     Very High     0-19 Y     0 - 109     ---    110-129   >/= 130     ----    20-24 Y     0 -  119     ---    120-159   >/= 160     ----      >24 Y     0 -  99   100-129  130-159   160-189     >/=190     04/16/2024 08:27  (H) <=99 mg/dL Final     Comment:                                 Near   Borderline      AGE      Desirable  Optimal    High     High     Very High     0-19 Y     0 - 109     ---    110-129   >/= 130     ----    20-24 Y     0 - 119     ---    120-159   >/= 160     ----      >24 Y     0 -  99   100-129  130-159   160-189     >/=190         Pertinent Imaging  CT angio chest abdomen pelvis    Result Date: 11/15/2024  STUDY: CT Angiogram of the Chest, Abdomen, and Pelvis; 11/15/2024 6:57 PM INDICATION: Crushing chest pain radiating to back, assess for dissection. COMPARISON: CT coronary calcium evaluation 10/18/2024.  CT chest 05/09/2024, 06/15/2023, 03/02/2023, 03/07/2022. ACCESSION NUMBER(S): [id number] ORDERING CLINICIAN: KENNY ESQUIVEL TECHNIQUE:  Helical CT is performed from the lung apices through the symphysis pubis after bolus administration of 90 mL of Omnipaque-350. Images are reviewed and processed at a workstation according to the CT angiogram protocol with 3-D and/or MIP post processing imaging generated. Automated mA/kV exposure control was utilized and patient examination was performed in strict accordance with principles of ALARA. FINDINGS:  Emphysematous changes are seen within the lungs.  There is subsegmental atelectasis in the lower lobes bilaterally, as well as the lingula.  No pleural effusions are visualized.  There is no evidence of a pneumothorax.  No filling defects are seen within the trachea or mainstem bronchi.  No enlarged lymph as are seen in the mediastinal or hilar regions.  There is no pericardial effusion. Coronary artery calcifications are present.  No filling defects are seen in the central pulmonary arteries.  There is no evidence of aneurysmal dilatation of the ascending aorta, aortic arch, or descending thoracic aorta.  No intimal flap is  visualized to indicate a dissection.  Degenerative changes are seen throughout the thoracic spine.  There is a nodular density in the medial right breast measured at 2.0 x 2.8 cm(Series 301, Image 259).  There is a nodule in the central left breast, measured at 8 x 8 mm(Series 301, Image 276). These are unchanged from prior studies. No acute findings are seen within the liver, spleen, pancreas, or adrenal glands.  The gallbladder is mildly distended.  No acute findings are seen within either kidney.  No dilated loops of small bowel or colon are visualized.  There is no evidence for free intraperitoneal air.  The appendix is normal in appearance.  No enlarged lymph nodes are seen within the pelvic sidewalls, iliac chains, or retroperitoneum.  No prominent edema is seen within the psoas musculature.  No compression deformities are visualized within the lumbar spine.  There is no evidence of aneurysmal dilatation of the abdominal aorta.  No intimal flap is visualized to indicate a dissection.  Plaque is seen throughout the common iliac arteries, with no significant stenoses.  The external iliac and common femoral arteries are patent bilaterally.  The celiac, superior mesenteric, and inferior mesenteric arteries are all patent.  There are single renal arteries present bilaterally, both of which are patent.    CHEST: 1.  No evidence for aneurysmal dilatation or dissection involving the thoracic aorta. 2.  Coronary artery calcifications. 3.  Emphysematous changes, with bibasilar atelectasis. 4.  Incidental nodular densities in both breasts.  These are unchanged from prior studies.  Correlation with prior mammography is recommended. ABDOMEN/PELVIS: 1.  No evidence of aneurysmal dilatation or dissection involving the abdominal aorta. Signed by Glenn Christopher MD         Assessment/Plan   Vinh Haq is a 59 y.o. female, with a PMH of HTN, HLD, CAD, elevated CAC score (325) with plans for op stress, mid aortic  "regurgitation, nicotine dependence with glanular cell tumor of left lung, emphysema, hx of thyroid cancer hypothyroidism, vitamin D deficiency, obesity, osteoarthritis, who presented to [facility] ED on 11/15/2024 for crushing chest pain radiating to back .    ED Course: /91 otherwise VSS. Labs largely unremarkable, HST 3->3. 2. EKG with no acute ischemic changes notes. CTA chest -negative for PE. Coronary artery calcifications.  Emphysematous changes, with bibasilar atelectasis. Incidental nodular densities in both breasts.  These are unchanged from prior studies. Received 324mg asa, dilaudid 0.5g IVP, Toradol 15mg IVP, Carafate     Chest pain  Elevated CAC score  Coronary artery calcifications on CT  HST negative x 2  EKG no acute ischemic changes   -asa/statin/zetia  -check lipid panel, A1c  -monitor on tele  -cardiology consultation    Breast nodules, seen on prior imaging. Appears Dr. Gonzales sent referral for ultrasound to further evaluate     HTN  HLD  -mildly hypertensive on admission  -resume home medications and monitor     Nicotine dependence   Emphysema  Granular cell tumor L lung    Multinodular goiter now s/p total tx with surgical hypothyroidism   -continue Synthroid     GI/VTE PPX: PPI, SQ lovenox  Code Status: No Order    Chart, medical history, and labs/testing reviewed in detail.   Case and plan of care to be discussed with attending provider.      Disposition: Discharge home once medically cleared and stable, pending cardiology evaluation and clearance     ALEKSEY Jackson-CNP   Observation/Internal Med [facility]  [facility]  11/15/24  8:44 PM  Total time of {Mins:98105::\"45\"} minutes spent on professional and overall care, with >50% of time dedicated to counseling/coordination of care.      "

## 2024-11-16 NOTE — DISCHARGE SUMMARY
Discharge Diagnosis  Chest pain    Issues Requiring Follow-Up    Back Pain,  Acute, Upper Left  -She presented to ED with reported CP, that initially started in her back, and radiates when she is sitting up or standing up.    -EKG and troponin not concerning for cardiac process, pain improved with pain meds, remains HDS.  -Cardiology saw her, cleared for cardiac causes, no acute intervention or changes to her medications, encourage her to keep her upcomming appointment for cardiac stress test.  -Will DC her home with three days of PO Toradol, 3 days of diclofenac gel, lidocaine patches, and PRN tylenol.  -Placing referral for FU with her PCP Fran Gonzales within 7-10 days post discharge to ensure her back pain has improved, and whether he wants any FU monitoring of her kidney function post NSAID use.    CAD  HTN  HLD  -Per cardiology, we are ending her home on her home regiment without any changes.  -Pt was loaded with ASA for concerned ACS in ED, do not see baby aspirin on her med recc, but per pt she has been taking this medication prior to this admission  -We obtained recent A1c and lipid panel this admission  -Placing referral for FU with her PCP within 7-10 days post discharge, they can determine if they want to make any changes to her home regiment at this time.    Subclinical  hypothyroidism  -Her TSH this admission was 5.08, her T4 was .90.  -Does not report any s/s concerning for hypothyroid symptoms  -We are sending her home on her home synthroid dose  -She can FU with her PCP post DC to determine if any other changes or FU is required.      Nicotine dependence   Emphysema  Granular cell tumor L lung  -Current smoker  -Known lung nodules found on CT  -Will DC on nicotine patch  -FU with OP PCP within 7-10 days upon DC, can determine if any further PATTON or referrals necessary.    Incidental Breast Nodules on imaging  -seen on prior imaging, unchanged from prior.  -Appears Dr. Gonzales sent referral for  ultrasound to further evaluate   -FU with OP PCP within 7-10 days upon DC, can determine if any further PATTON or referrals necessary.    Discharge Meds     Medication List      START taking these medications     acetaminophen 325 mg tablet; Commonly known as: Tylenol; Take 2 tablets   (650 mg) by mouth every 4 hours if needed for mild pain (1 - 3).   aspirin 81 mg EC tablet; Take 1 tablet (81 mg) by mouth once daily.;   Start taking on: November 17, 2024   diclofenac sodium 1 % gel; Commonly known as: Voltaren; Apply 4.5 inches   (4 g) topically 4 times a day for 3 days.   ketorolac 10 mg tablet; Commonly known as: Toradol; Take 1 tablet (10   mg) by mouth every 6 hours if needed for moderate pain (4 - 6) for up to 3   days.   lidocaine 5 % patch; Commonly known as: Lidoderm; Place 1 patch over 12   hours on the skin once daily. Apply to painful area 12 hours per day,   remove for 12 hours.   nicotine 7 mg/24 hr patch; Commonly known as: Nicoderm CQ; Place 1 patch   over 24 hours on the skin once every 24 hours for 15 days.     CONTINUE taking these medications     ezetimibe 10 mg tablet; Commonly known as: Zetia; Take 1 tablet (10 mg)   by mouth once daily.   levothyroxine 137 mcg tablet; Commonly known as: Synthroid, Levoxyl;   Take 1 tablet (137 mcg) by mouth once daily in the morning. Take before   meals.   lisinopril 40 mg tablet; Take 1 tablet (40 mg) by mouth once daily.   polyethylene glycol 236-22.74-6.74 -5.86 gram solution; Commonly known   as: GoLYTELY; Drink 1/2 starting at 6 pm the night before your procedure   then drink the 2nd 1/2 5 hours before procedure arrival time   rosuvastatin 40 mg tablet; Commonly known as: Crestor; TAKE 1 TABLET BY   MOUTH ONCE DAILY   valACYclovir 500 mg tablet; Commonly known as: Valtrex; TAKE 1 TABLET BY   MOUTH ONCE DAILY   varenicline 0.5 mg (11)- 1 mg (42) tablet; Commonly known as: Chantix   ALETHEA; Days 1 to 3: 0.5 mg by mouth once a day. Days 4 to 7: 0.5 mg 2 times   per  day. Days 8 to end of treatment: 1 mg 2 times per day as directed on   package.   Vitamin D2 1,250 mcg (50,000 unit) capsule; Generic drug:   ergocalciferol; Take 1 capsule (50,000 Units) by mouth 1 (one) time per   week.     Test Results Pending At Discharge  Pending Labs       No current pending labs.          Hospital Course  History of present illness:  This is a 59 y.o. female with PMH of HTN, HLD, CAD, elevated CAD score (325) with plans for op stress, mild aortic regurgitation, nicotine dependence, glanular cell tumor of L lung, emphysema, hx of thyroid cancer, hypothyroidism, vit d deficency, obesity, and OA.     Presents to [facility] ED on 11/15/2024 for crushing chest pain that radiates to the back.     Per patient, pain initially began in back as cramping pressure while at  [employer], where she works, where she sits at a desk for most of the day.  Upon leaving work and starting her drive home, can pain in her back began to worsen.  She also endorses a brief period of blurry vision that did not sustain and has since resolved.  When she got home, the pain in her back was so excruciating she had to lay down and go to sleep.    The following morning, the back pain was 8/10 and was radiating to her L neck, across her L breast, and down her L arm.  She endorses SOB secondary to pain, denies HA, dizziness, diaphoresis.  The pain is worse when she walks, although it is hard to determine if it is exertional or positional.  The pain got so excruciating that she presented to ed.     In the ED /91 and HDS.  HsT 3->2, EKG without acute ischemic changes.  CTA chest negative for PE, coronary artery calcifications and emphysematous changes with bibasilar atelectasis.  Incidental nodular densities in both breasts that are unchanged from previous studies.  She received 324 mg ASA, dilaudid IV, toradol 15 mg IVP, carafate, and was admitted for CP work up.       At the bedside she is laying flat.  She states she  still has the crampy back in in the LUQ of her back, that is mild and reproducible.  When she sits up straight in bed, she states that the radiating pain to the L neck and L breast act up again.  When she lays down, those pains resolve.  All of her pain was relieved by pain meds in the ED.     Upon assessing her, it is felt that this pain is more skeletal-muscle in nature.  Cardiology evaluated her, and cleared her for safe DC without acute intervention.  We will DC her with pain medications for skeletal muscle pain, with FU with her PCP.    ASSESSMENT/PLAN:    Problem List    Chest pain  -Crushing, radiating to back  -HST negative x 2, EKG no acute ischemic changes  -Was started on asa  -A1c 6.2  -lipid panel with cholesterol 223, ldl 156, non hdl 183  -Cardiology consulted  -per reported HPI, appears to be more SKM  Plan:  -Continue asa/statin/zetia  -monitor on tele  -cardiology consultation  :OP stress test upon DC.  She has one scheduled for January, cardiology has encouraged her to keep that schedule              :DC on home medications              :FU with PCP upon DC              :No cardiac barriers, cleared for DC     HTN  HLD  -See lipid panels above  -BP acceptable at this time  -Continue home lisinopril and rosuvastatin        Multinodular goiter now s/p total tx with surgical hypothyroidism   -TSH 5.08, T4 .91, sublicinial hypothyroidism  -continue Synthroid, FU with PCP OP upon DC for any future monitoring      Nicotine dependence   Emphysema  Granular cell tumor L lung  -Encourage smoking cessation  -Will place referral for FU with her PCP upon DC to see if any interventions or monitoring needs to be done.     Incidental Breast Nodules on imaging  -seen on prior imaging.  -Appears Dr. Gonzales sent referral for ultrasound to further evaluate      Pertinent Physical Exam At Time of Discharge  Physical Exam  Constitutional:       General: She is awake.      Appearance: Normal appearance.    Cardiovascular:      Rate and Rhythm: Normal rate and regular rhythm.      Heart sounds: Murmur heard.      Comments: Systolic murmur heard at Aortic, pulmonic, and tricuspid region.   Pulmonary:      Effort: Pulmonary effort is normal.      Breath sounds: Normal breath sounds.   Abdominal:      General: Abdomen is flat. Bowel sounds are normal. There is no distension.      Palpations: Abdomen is soft.      Tenderness: There is no abdominal tenderness.   Skin:     General: Skin is warm and dry.      Capillary Refill: Capillary refill takes less than 2 seconds.   Neurological:      General: No focal deficit present.      Mental Status: She is alert and oriented to person, place, and time. Mental status is at baseline.   Psychiatric:         Attention and Perception: Attention and perception normal.         Mood and Affect: Mood and affect normal.         Speech: Speech normal.         Behavior: Behavior is cooperative.         Thought Content: Thought content normal.         Cognition and Memory: Cognition and memory normal.         Judgment: Judgment normal.     Results for orders placed or performed during the hospital encounter of 11/15/24 (from the past 24 hours)   CBC and Auto Differential   Result Value Ref Range    WBC 9.8 4.4 - 11.3 x10*3/uL    nRBC 0.0 0.0 - 0.0 /100 WBCs    RBC 4.45 4.00 - 5.20 x10*6/uL    Hemoglobin 12.8 12.0 - 16.0 g/dL    Hematocrit 38.7 36.0 - 46.0 %    MCV 87 80 - 100 fL    MCH 28.8 26.0 - 34.0 pg    MCHC 33.1 32.0 - 36.0 g/dL    RDW 15.4 (H) 11.5 - 14.5 %    Platelets 380 150 - 450 x10*3/uL    Neutrophils % 46.7 40.0 - 80.0 %    Immature Granulocytes %, Automated 0.3 0.0 - 0.9 %    Lymphocytes % 46.1 13.0 - 44.0 %    Monocytes % 4.0 2.0 - 10.0 %    Eosinophils % 2.6 0.0 - 6.0 %    Basophils % 0.3 0.0 - 2.0 %    Neutrophils Absolute 4.58 1.20 - 7.70 x10*3/uL    Immature Granulocytes Absolute, Automated 0.03 0.00 - 0.70 x10*3/uL    Lymphocytes Absolute 4.52 1.20 - 4.80 x10*3/uL     Monocytes Absolute 0.39 0.10 - 1.00 x10*3/uL    Eosinophils Absolute 0.25 0.00 - 0.70 x10*3/uL    Basophils Absolute 0.03 0.00 - 0.10 x10*3/uL   Comprehensive metabolic panel   Result Value Ref Range    Glucose 98 74 - 99 mg/dL    Sodium 138 136 - 145 mmol/L    Potassium 4.0 3.5 - 5.3 mmol/L    Chloride 107 98 - 107 mmol/L    Bicarbonate 24 21 - 32 mmol/L    Anion Gap 11 10 - 20 mmol/L    Urea Nitrogen 23 6 - 23 mg/dL    Creatinine 0.82 0.50 - 1.05 mg/dL    eGFR 81 >60 mL/min/1.73m*2    Calcium 8.9 8.6 - 10.3 mg/dL    Albumin 4.2 3.4 - 5.0 g/dL    Alkaline Phosphatase 65 33 - 136 U/L    Total Protein 7.2 6.4 - 8.2 g/dL    AST 15 9 - 39 U/L    Bilirubin, Total 0.3 0.0 - 1.2 mg/dL    ALT 25 7 - 45 U/L   B-Type Natriuretic Peptide   Result Value Ref Range    BNP 12 0 - 99 pg/mL   Troponin I, High Sensitivity, Initial   Result Value Ref Range    Troponin I, High Sensitivity 3 0 - 13 ng/L   Lipase   Result Value Ref Range    Lipase 31 9 - 82 U/L   Hemoglobin A1C   Result Value Ref Range    Hemoglobin A1C 6.2 (H) See comment %    Estimated Average Glucose 131 Not Established mg/dL   Troponin, High Sensitivity, 1 Hour   Result Value Ref Range    Troponin I, High Sensitivity 3 0 - 13 ng/L   TSH   Result Value Ref Range    Thyroid Stimulating Hormone 5.08 (H) 0.44 - 3.98 mIU/L   Lipid panel   Result Value Ref Range    Cholesterol 223 (H) 0 - 199 mg/dL    HDL-Cholesterol 39.9 mg/dL    Cholesterol/HDL Ratio 5.6     LDL Calculated 156 (H) <=99 mg/dL    VLDL 27 0 - 40 mg/dL    Triglycerides 135 0 - 149 mg/dL    Non HDL Cholesterol 183 (H) 0 - 149 mg/dL   CBC   Result Value Ref Range    WBC 7.6 4.4 - 11.3 x10*3/uL    nRBC 0.0 0.0 - 0.0 /100 WBCs    RBC 4.34 4.00 - 5.20 x10*6/uL    Hemoglobin 12.5 12.0 - 16.0 g/dL    Hematocrit 38.5 36.0 - 46.0 %    MCV 89 80 - 100 fL    MCH 28.8 26.0 - 34.0 pg    MCHC 32.5 32.0 - 36.0 g/dL    RDW 15.6 (H) 11.5 - 14.5 %    Platelets 355 150 - 450 x10*3/uL   Comprehensive metabolic panel   Result  Value Ref Range    Glucose 93 74 - 99 mg/dL    Sodium 140 136 - 145 mmol/L    Potassium 4.3 3.5 - 5.3 mmol/L    Chloride 108 (H) 98 - 107 mmol/L    Bicarbonate 26 21 - 32 mmol/L    Anion Gap 10 10 - 20 mmol/L    Urea Nitrogen 20 6 - 23 mg/dL    Creatinine 0.74 0.50 - 1.05 mg/dL    eGFR >90 >60 mL/min/1.73m*2    Calcium 9.2 8.6 - 10.3 mg/dL    Albumin 4.1 3.4 - 5.0 g/dL    Alkaline Phosphatase 66 33 - 136 U/L    Total Protein 6.4 6.4 - 8.2 g/dL    AST 13 9 - 39 U/L    Bilirubin, Total 0.5 0.0 - 1.2 mg/dL    ALT 21 7 - 45 U/L   T4, free   Result Value Ref Range    Thyroxine, Free 0.90 0.61 - 1.12 ng/dL     Outpatient Follow-Up  Future Appointments   Date Time Provider Department Center   1/2/2025  1:00 [place] [facility] [place] [id number] [facility]   1/30/2025  8:30 AM Sobia Smith MD [id number] [facility]   2/12/2025  7:20 AM Fran Gonzales MD [id number] [facility]         Asif Paz, APRN-CNP

## 2024-11-18 LAB
ATRIAL RATE: 57 BPM
ATRIAL RATE: 59 BPM
P AXIS: 48 DEGREES
P AXIS: 75 DEGREES
P OFFSET: 187 MS
P OFFSET: 191 MS
P ONSET: 130 MS
P ONSET: 134 MS
PR INTERVAL: 174 MS
PR INTERVAL: 178 MS
Q ONSET: 219 MS
Q ONSET: 221 MS
QRS COUNT: 10 BEATS
QRS COUNT: 9 BEATS
QRS DURATION: 76 MS
QRS DURATION: 90 MS
QT INTERVAL: 404 MS
QT INTERVAL: 448 MS
QTC CALCULATION(BAZETT): 399 MS
QTC CALCULATION(BAZETT): 436 MS
QTC FREDERICIA: 401 MS
QTC FREDERICIA: 440 MS
R AXIS: 36 DEGREES
R AXIS: 4 DEGREES
T AXIS: 28 DEGREES
T AXIS: 43 DEGREES
T OFFSET: 421 MS
T OFFSET: 445 MS
VENTRICULAR RATE: 57 BPM
VENTRICULAR RATE: 59 BPM

## 2024-11-19 ENCOUNTER — PATIENT OUTREACH (OUTPATIENT)
Dept: CARE COORDINATION | Facility: CLINIC | Age: 59
End: 2024-11-19
Payer: COMMERCIAL

## 2024-11-19 ENCOUNTER — PHARMACY VISIT (OUTPATIENT)
Dept: PHARMACY | Facility: CLINIC | Age: 59
End: 2024-11-19
Payer: COMMERCIAL

## 2024-11-19 PROCEDURE — RXMED WILLOW AMBULATORY MEDICATION CHARGE

## 2024-11-19 RX ORDER — LISINOPRIL 40 MG/1
40 TABLET ORAL DAILY
Qty: 90 TABLET | Refills: 3 | Status: SHIPPED | OUTPATIENT
Start: 2024-11-19

## 2024-11-19 RX ORDER — ERGOCALCIFEROL 1.25 MG/1
50000 CAPSULE ORAL
Qty: 12 CAPSULE | Refills: 0 | Status: SHIPPED | OUTPATIENT
Start: 2024-11-24 | End: 2025-02-16

## 2024-11-19 NOTE — PROGRESS NOTES
[facility] member [facility]  outreach.    Spoke with member. I introduced myself and purpose of call.  Confirmed : Yes  No new or worsening symptoms.  Member has Rx given at discharge. Has only taken 1 Toradol. Has not applied lidocaine patch but has been applying heating pad.  Vinh has called PCP office and is waiting for return call to schedule F/U appointment.  She is also trying to quit smoking.  Advised to keep the cardiology appointment that is scheduled  No questions or concerns that this time.  Kassy TRAN, [facility] Population Health  Office Phone: [phone]     Engagement  Call Start Time: 1140 (2024 11:46 AM)    Medications  Medications reviewed with patient/caregiver?: Yes (2024 11:46 AM)  Is the patient having any side effects they believe may be caused by any medication additions or changes?: No (2024 11:46 AM)  Does the patient have all medications ordered at discharge?: Yes (2024 11:46 AM)  Care Management Interventions: No intervention needed (2024 11:46 AM)  Is the patient taking all medications as directed (includes completed medication regime)?: Yes (Toradol as needed, has only taken 1 dose. Has not used lidocaine patch as of yet.) (2024 11:46 AM)    Appointments  Does the patient have a primary care provider?: Yes (2024 11:46 AM)  Care Management Interventions: -- (Member has called PCP office and is waiting for return call with available appt time.) (2024 11:46 AM)  Has the patient kept scheduled appointments due by today?: Not applicable (2024 11:46 AM)    Patient Teaching  Does the patient have access to their discharge instructions?: Yes (2024 11:46 AM)  What is the patient's perception of their health status since discharge?: Improving (2024 11:46 AM)

## 2024-11-21 ENCOUNTER — OFFICE VISIT (OUTPATIENT)
Dept: PRIMARY CARE | Facility: CLINIC | Age: 59
End: 2024-11-21
Payer: COMMERCIAL

## 2024-11-21 VITALS
HEART RATE: 80 BPM | BODY MASS INDEX: 35.52 KG/M2 | SYSTOLIC BLOOD PRESSURE: 134 MMHG | DIASTOLIC BLOOD PRESSURE: 76 MMHG | WEIGHT: 188 LBS

## 2024-11-21 DIAGNOSIS — E03.9 HYPOTHYROIDISM, UNSPECIFIED TYPE: ICD-10-CM

## 2024-11-21 DIAGNOSIS — C73: ICD-10-CM

## 2024-11-21 DIAGNOSIS — R93.1 AGATSTON CORONARY ARTERY CALCIUM SCORE BETWEEN 200 AND 399: ICD-10-CM

## 2024-11-21 DIAGNOSIS — E78.2 HYPERLIPEMIA, MIXED: ICD-10-CM

## 2024-11-21 DIAGNOSIS — R92.2 DENSE BREASTS: Primary | ICD-10-CM

## 2024-11-21 DIAGNOSIS — I10 HYPERTENSION, UNSPECIFIED TYPE: ICD-10-CM

## 2024-11-21 DIAGNOSIS — R92.30 DENSE BREASTS: Primary | ICD-10-CM

## 2024-11-21 DIAGNOSIS — F17.210 CIGARETTE NICOTINE DEPENDENCE WITHOUT COMPLICATION: ICD-10-CM

## 2024-11-21 DIAGNOSIS — R07.9 CHEST PAIN, UNSPECIFIED TYPE: ICD-10-CM

## 2024-11-21 DIAGNOSIS — E66.01 CLASS 2 SEVERE OBESITY DUE TO EXCESS CALORIES WITH SERIOUS COMORBIDITY AND BODY MASS INDEX (BMI) OF 35.0 TO 35.9 IN ADULT: ICD-10-CM

## 2024-11-21 DIAGNOSIS — E66.812 CLASS 2 SEVERE OBESITY DUE TO EXCESS CALORIES WITH SERIOUS COMORBIDITY AND BODY MASS INDEX (BMI) OF 35.0 TO 35.9 IN ADULT: ICD-10-CM

## 2024-11-21 DIAGNOSIS — D21.9 GRANULAR CELL TUMOR: ICD-10-CM

## 2024-11-21 PROCEDURE — 3078F DIAST BP <80 MM HG: CPT | Performed by: INTERNAL MEDICINE

## 2024-11-21 PROCEDURE — 99214 OFFICE O/P EST MOD 30 MIN: CPT | Performed by: INTERNAL MEDICINE

## 2024-11-21 PROCEDURE — 3075F SYST BP GE 130 - 139MM HG: CPT | Performed by: INTERNAL MEDICINE

## 2024-11-21 RX ORDER — LISINOPRIL 40 MG/1
40 TABLET ORAL DAILY
Qty: 90 TABLET | Refills: 3 | Status: SHIPPED | OUTPATIENT
Start: 2024-11-21

## 2024-11-21 ASSESSMENT — ENCOUNTER SYMPTOMS
HEADACHES: 0
DYSURIA: 0
FACIAL SWELLING: 0
FATIGUE: 0
NECK STIFFNESS: 0
BLOOD IN STOOL: 0
SHORTNESS OF BREATH: 0
RECTAL PAIN: 0
NAUSEA: 0
MYALGIAS: 0
PHOTOPHOBIA: 0
WHEEZING: 0
VOICE CHANGE: 0
TROUBLE SWALLOWING: 0
RHINORRHEA: 0
FREQUENCY: 0
BRUISES/BLEEDS EASILY: 0
SEIZURES: 0
DIAPHORESIS: 0
POLYDIPSIA: 0
SINUS PAIN: 0
DIFFICULTY URINATING: 0
COUGH: 0
CHEST TIGHTNESS: 0
ANAL BLEEDING: 0
DIZZINESS: 0
FACIAL ASYMMETRY: 0
VOMITING: 0
NUMBNESS: 0
TREMORS: 0
EYE ITCHING: 0
ARTHRALGIAS: 0
ACTIVITY CHANGE: 0
SLEEP DISTURBANCE: 0
NECK PAIN: 0
ADENOPATHY: 0
PALPITATIONS: 0
JOINT SWELLING: 0
APPETITE CHANGE: 0
EYE REDNESS: 0
CONSTIPATION: 0
SPEECH DIFFICULTY: 0
COLOR CHANGE: 0
WEAKNESS: 0
ABDOMINAL DISTENTION: 0
ABDOMINAL PAIN: 0
EYE PAIN: 0
WOUND: 0
CHOKING: 0
EYE DISCHARGE: 0
BACK PAIN: 0
HEMATURIA: 0
CHILLS: 0
DIARRHEA: 0
LIGHT-HEADEDNESS: 0
STRIDOR: 0
POLYPHAGIA: 0
SORE THROAT: 0
SINUS PRESSURE: 0
FLANK PAIN: 0

## 2024-11-21 ASSESSMENT — PAIN SCALES - GENERAL: PAINLEVEL_OUTOF10: 3

## 2024-11-21 NOTE — PROGRESS NOTES
Subjective   Patient ID: JESSICA Haq is a 59 y.o. female who presents for No chief complaint on file..    Patient presents for posthospitalization visit.  She is hospitalized at [facility] with chest and back pain.  Her workup was negative.  It was thought musculoskeletal in nature.  She reports resolution of her symptoms.  She overall feels well.  She denies any headaches, no dizziness, no chest pain or shortness of breath.  She denies abdominal pain no nausea vomiting or diarrhea.  She is cut back on smoking.    The syncope    Review of Systems   Constitutional:  Negative for activity change, appetite change, chills, diaphoresis and fatigue.   HENT:  Negative for congestion, dental problem, drooling, ear discharge, ear pain, facial swelling, hearing loss, mouth sores, nosebleeds, postnasal drip, rhinorrhea, sinus pressure, sinus pain, sneezing, sore throat, tinnitus, trouble swallowing and voice change.    Eyes:  Negative for photophobia, pain, discharge, redness, itching and visual disturbance.   Respiratory:  Negative for cough, choking, chest tightness, shortness of breath, wheezing and stridor.    Cardiovascular:  Negative for chest pain, palpitations and leg swelling.   Gastrointestinal:  Negative for abdominal distention, abdominal pain, anal bleeding, blood in stool, constipation, diarrhea, nausea, rectal pain and vomiting.   Endocrine: Negative for cold intolerance, heat intolerance, polydipsia, polyphagia and polyuria.   Genitourinary:  Negative for decreased urine volume, difficulty urinating, dysuria, enuresis, flank pain, frequency, genital sores, hematuria and urgency.   Musculoskeletal:  Negative for arthralgias, back pain, gait problem, joint swelling, myalgias, neck pain and neck stiffness.   Skin:  Negative for color change, pallor, rash and wound.   Neurological:  Negative for dizziness, tremors, seizures, syncope, facial asymmetry, speech difficulty, weakness, light-headedness,  numbness and headaches.   Hematological:  Negative for adenopathy. Does not bruise/bleed easily.   Psychiatric/Behavioral:  Negative for sleep disturbance.        Objective   /76   Pulse 80   Wt 85.3 kg (188 lb)   LMP 11/03/2021   BMI 35.52 kg/m²     Physical Exam  Constitutional:       Appearance: Normal appearance.   Cardiovascular:      Rate and Rhythm: Normal rate and regular rhythm.      Heart sounds: No murmur heard.     No gallop.   Pulmonary:      Effort: No respiratory distress.      Breath sounds: No wheezing or rales.   Abdominal:      General: There is no distension.      Palpations: There is no mass.      Tenderness: There is no abdominal tenderness. There is no guarding.   Musculoskeletal:      Right lower leg: No edema.      Left lower leg: No edema.   Neurological:      Mental Status: She is alert.         Assessment/Plan   Diagnoses and all orders for this visit:  Dense breasts-schedule ultrasound  -     BI US breast complete bilateral; Future  Hypertension, unspecified type-low-salt diet and exercise.  -     lisinopril 40 mg tablet; Take 1 tablet (40 mg) by mouth once daily.  Chest pain, unspecified type--symptoms have resolved.  She will go to emergency room symptoms return.  Keep appointment for stress test  Hyperlipemia, mixed--she is instructed to take Zetia and rosuvastatin.  Hypothyroidism, unspecified type-she will schedule appoint with endocrinology  Granular cell tumor-follow-up pulmonary  Carcinoma of thyroid (Multi)-schedule appoint with endocrinology  Cigarette nicotine dependence without complication-she is taking the patch and Chantix.  Total cessation strongly encouraged.  Health maintenance-colonoscopy has been done.  Refuses immunizations.  Mammogram is up-to-date.  Pap with GYN.  She will schedule eye and dental appointment.  Obesity-she will diet and exercise

## 2024-11-23 ENCOUNTER — PHARMACY VISIT (OUTPATIENT)
Dept: PHARMACY | Facility: CLINIC | Age: 59
End: 2024-11-23
Payer: COMMERCIAL

## 2024-12-03 ENCOUNTER — PATIENT OUTREACH (OUTPATIENT)
Dept: CARE COORDINATION | Facility: CLINIC | Age: 59
End: 2024-12-03
Payer: COMMERCIAL

## 2024-12-03 NOTE — PROGRESS NOTES
[facility] follow up outreach. Spoke with member. States she is doing well. Pain comes and goes, but she thinks she may have over done it physically to cause the pain. Notes not having to take the tramadol. Completed PCP follow up 11/1/24.  It has been 9 days since her last cigarette. I shared with her that beginning 1/1/25 she will be able to participate in a Nicotine Cessation Course provided through . Log onto the [facility] and search for the program. If completed, she will be eligible for reimbursement of the nicotine surcharge.  Ronna knows that I am available in the future if needed. No assistance provided today.    Kassy TRAN, [facility] Population Health  Office Phone: [phone]

## 2025-01-02 ENCOUNTER — APPOINTMENT (OUTPATIENT)
Dept: CARDIOLOGY | Facility: HOSPITAL | Age: 60
End: 2025-01-02
Payer: COMMERCIAL

## 2025-01-15 ENCOUNTER — HOSPITAL ENCOUNTER (OUTPATIENT)
Dept: CARDIOLOGY | Facility: HOSPITAL | Age: 60
Discharge: HOME | End: 2025-01-15
Payer: COMMERCIAL

## 2025-01-15 DIAGNOSIS — R93.1 AGATSTON CORONARY ARTERY CALCIUM SCORE BETWEEN 200 AND 399: ICD-10-CM

## 2025-01-15 PROCEDURE — 93018 CV STRESS TEST I&R ONLY: CPT | Performed by: INTERNAL MEDICINE

## 2025-01-15 PROCEDURE — 93325 DOPPLER ECHO COLOR FLOW MAPG: CPT | Performed by: INTERNAL MEDICINE

## 2025-01-15 PROCEDURE — 93350 STRESS TTE ONLY: CPT | Performed by: INTERNAL MEDICINE

## 2025-01-15 PROCEDURE — 93325 DOPPLER ECHO COLOR FLOW MAPG: CPT

## 2025-01-15 PROCEDURE — 93016 CV STRESS TEST SUPVJ ONLY: CPT | Performed by: INTERNAL MEDICINE

## 2025-01-23 ENCOUNTER — TELEPHONE (OUTPATIENT)
Dept: ENDOCRINOLOGY | Facility: CLINIC | Age: 60
End: 2025-01-23
Payer: COMMERCIAL

## 2025-01-30 ENCOUNTER — APPOINTMENT (OUTPATIENT)
Dept: ENDOCRINOLOGY | Facility: CLINIC | Age: 60
End: 2025-01-30
Payer: COMMERCIAL

## 2025-01-30 VITALS — DIASTOLIC BLOOD PRESSURE: 71 MMHG | SYSTOLIC BLOOD PRESSURE: 129 MMHG

## 2025-01-30 DIAGNOSIS — E03.9 HYPOTHYROIDISM, UNSPECIFIED TYPE: Primary | ICD-10-CM

## 2025-01-30 PROCEDURE — 99213 OFFICE O/P EST LOW 20 MIN: CPT | Performed by: INTERNAL MEDICINE

## 2025-01-30 PROCEDURE — 3078F DIAST BP <80 MM HG: CPT | Performed by: INTERNAL MEDICINE

## 2025-01-30 PROCEDURE — 3074F SYST BP LT 130 MM HG: CPT | Performed by: INTERNAL MEDICINE

## 2025-01-30 ASSESSMENT — ENCOUNTER SYMPTOMS
FATIGUE: 0
VOMITING: 0
CHILLS: 0
COUGH: 0
DIARRHEA: 0
PALPITATIONS: 0
SHORTNESS OF BREATH: 0
NAUSEA: 0
HEADACHES: 0
FEVER: 0

## 2025-01-30 NOTE — ASSESSMENT & PLAN NOTE
Blood work reviewed,  recheck in near future  Adjust based on results  Follow up in one year  Orders:    TSH with reflex to Free T4 if abnormal; Future

## 2025-01-30 NOTE — PROGRESS NOTES
"Endocrinology: Follow up visit  Subjective   Patient ID: Vinh Haq \"VINH URIARTE" is a 59 y.o. female who presents for Hypothyroidism.    PCP: Fran Gonzales MD    HPI  I performed this visit using real-time telehealth tools, including an audio/video connection between the patient at their home and Dr Sobia Smith [place], [place].    S/p total tx   T4 137 x6   Taking meds  as directed  Last year had to adjust due to high levels.  Recent recheck when in hospital off a bit with tsh 4    Review of Systems   Constitutional:  Negative for chills, fatigue and fever.   Respiratory:  Negative for cough and shortness of breath.    Cardiovascular:  Negative for chest pain and palpitations.   Gastrointestinal:  Negative for diarrhea, nausea and vomiting.   Neurological:  Negative for headaches.       Patient Active Problem List   Diagnosis    Blepharitis of both eyes    Carcinoma of thyroid (Multi)    Granular cell tumor    Heart murmur    Hordeolum internum of left upper eyelid    Hyperlipemia, mixed    Hypertension    Hypothyroidism    Internal hordeolum of left eye    Left breast lump    Lump or mass in breast    Lung nodule    Lung tumor    Mild aortic regurgitation    Myopia of both eyes with astigmatism and presbyopia    Nicotine dependence, unspecified, uncomplicated    Nontoxic multinodular goiter    Papillary microcarcinoma of thyroid (Multi)    Perimenopausal menorrhagia    Postmenopausal bleeding    Primary genital herpes simplex infection    Primary osteoarthritis of left knee    Right shoulder pain    New onset headache    Skin lesion    Subclinical hyperthyroidism    Vaginal discharge    Vaginal yeast infection    Vitamin D deficiency    Thyroid cancer (Multi)    Sinus headache    Pain, joint, shoulder    Lumbar strain    Fibrocystic breast    Class 1 obesity with body mass index (BMI) of 34.0 to 34.9 in adult    Superficial punctate keratitis of left eye    Chest pain        Home Meds:  Current Outpatient " Medications   Medication Instructions    acetaminophen (TYLENOL) 650 mg, oral, Every 4 hours PRN    aspirin 81 mg, oral, Daily    ezetimibe (ZETIA) 10 mg, oral, Daily    levothyroxine (SYNTHROID, LEVOXYL) 137 mcg, oral, Daily before breakfast    lisinopril 40 mg, oral, Daily    lisinopril 40 mg, oral, Daily    nicotine (Nicoderm CQ) 7 mg/24 hr patch 1 patch, transdermal, Every 24 hours    polyethylene glycol (GoLYTELY) 236-22.74-6.74 -5.86 gram solution Drink 1/2 starting at 6 pm the night before your procedure then drink the 2nd 1/2 5 hours before procedure arrival time    rosuvastatin (Crestor) 40 mg tablet TAKE 1 TABLET BY MOUTH ONCE DAILY    valACYclovir (Valtrex) 500 mg tablet TAKE 1 TABLET BY MOUTH ONCE DAILY    varenicline (Chantix ALETHEA) 0.5 mg (11)- 1 mg (42) tablet Days 1 to 3: 0.5 mg by mouth once a day. Days 4 to 7: 0.5 mg 2 times per day. Days 8 to end of treatment: 1 mg 2 times per day as directed on package.    Vitamin D2 50,000 Units, oral, Once Weekly        No Known Allergies     Objective   Vitals:    01/30/25 0828   BP: 129/71      There were no vitals filed for this visit.   There is no height or weight on file to calculate BMI.   Physical Exam    Labs:  Lab Results   Component Value Date    HGBA1C 6.2 (H) 11/15/2024    TSH 5.08 (H) 11/15/2024    FREET4 0.90 11/16/2024      Lab Results   Component Value Date    THYROIDPAB 15 01/14/2020    TSI <1.0 01/14/2020        Assessment/Plan   Assessment & Plan  Hypothyroidism, unspecified type  Blood work reviewed,  recheck in near future  Adjust based on results  Follow up in one year  Orders:    TSH with reflex to Free T4 if abnormal; Future        Electronically signed by:  Sobia Smith MD 01/30/25 8:29 AM

## 2025-02-12 ENCOUNTER — APPOINTMENT (OUTPATIENT)
Dept: PRIMARY CARE | Facility: CLINIC | Age: 60
End: 2025-02-12
Payer: COMMERCIAL

## 2025-02-13 LAB — TSH SERPL-ACNC: 3.08 MIU/L (ref 0.4–4.5)

## 2025-02-18 PROCEDURE — RXMED WILLOW AMBULATORY MEDICATION CHARGE

## 2025-02-20 ENCOUNTER — PHARMACY VISIT (OUTPATIENT)
Dept: PHARMACY | Facility: CLINIC | Age: 60
End: 2025-02-20
Payer: COMMERCIAL

## 2025-03-06 ENCOUNTER — APPOINTMENT (OUTPATIENT)
Dept: OPHTHALMOLOGY | Facility: CLINIC | Age: 60
End: 2025-03-06
Payer: COMMERCIAL

## 2025-03-06 DIAGNOSIS — H25.13 AGE-RELATED NUCLEAR CATARACT OF BOTH EYES: ICD-10-CM

## 2025-03-06 DIAGNOSIS — H52.203 MYOPIA OF BOTH EYES WITH ASTIGMATISM AND PRESBYOPIA: Primary | ICD-10-CM

## 2025-03-06 DIAGNOSIS — H52.13 MYOPIA OF BOTH EYES WITH ASTIGMATISM AND PRESBYOPIA: Primary | ICD-10-CM

## 2025-03-06 DIAGNOSIS — H52.4 MYOPIA OF BOTH EYES WITH ASTIGMATISM AND PRESBYOPIA: Primary | ICD-10-CM

## 2025-03-06 PROCEDURE — 92014 COMPRE OPH EXAM EST PT 1/>: CPT | Performed by: OPTOMETRIST

## 2025-03-06 PROCEDURE — 92015 DETERMINE REFRACTIVE STATE: CPT | Performed by: OPTOMETRIST

## 2025-03-06 ASSESSMENT — CONF VISUAL FIELD
OS_INFERIOR_NASAL_RESTRICTION: 0
OS_NORMAL: 1
OD_INFERIOR_TEMPORAL_RESTRICTION: 0
OD_NORMAL: 1
OD_SUPERIOR_NASAL_RESTRICTION: 0
OS_INFERIOR_TEMPORAL_RESTRICTION: 0
OS_SUPERIOR_NASAL_RESTRICTION: 0
OD_INFERIOR_NASAL_RESTRICTION: 0
OS_SUPERIOR_TEMPORAL_RESTRICTION: 0
OD_SUPERIOR_TEMPORAL_RESTRICTION: 0

## 2025-03-06 ASSESSMENT — ENCOUNTER SYMPTOMS
EYES NEGATIVE: 0
CARDIOVASCULAR NEGATIVE: 0
HEMATOLOGIC/LYMPHATIC NEGATIVE: 0
MUSCULOSKELETAL NEGATIVE: 0
ALLERGIC/IMMUNOLOGIC NEGATIVE: 0
PSYCHIATRIC NEGATIVE: 0
RESPIRATORY NEGATIVE: 0
GASTROINTESTINAL NEGATIVE: 0
NEUROLOGICAL NEGATIVE: 0
ENDOCRINE NEGATIVE: 0
CONSTITUTIONAL NEGATIVE: 0

## 2025-03-06 ASSESSMENT — VISUAL ACUITY
CORRECTION_TYPE: GLASSES, CONTACTS
OD_CC: 20/30
METHOD: SNELLEN - LINEAR
OS_CC+: +2
OS_CC: 20/30
OD_CC+: -2

## 2025-03-06 ASSESSMENT — REFRACTION_MANIFEST
OD_CYLINDER: -1.75
OS_ADD: +2.50
OD_CYLINDER: -1.75
OD_AXIS: 070
OS_SPHERE: -7.25
OD_CYLINDER: -1.75
OD_AXIS: 070
OD_ADD: +2.50
OS_SPHERE: -7.25
OD_SPHERE: -6.00
OD_SPHERE: -5.75
OD_SPHERE: -5.75
OD_AXIS: 070
OS_SPHERE: -7.75
METHOD_AUTOREFRACTION: 1

## 2025-03-06 ASSESSMENT — SLIT LAMP EXAM - LIDS
COMMENTS: NORMAL
COMMENTS: NORMAL

## 2025-03-06 ASSESSMENT — TONOMETRY
OD_IOP_MMHG: 11
OS_IOP_MMHG: 10
IOP_METHOD: GOLDMANN APPLANATION

## 2025-03-06 ASSESSMENT — REFRACTION_WEARINGRX
OD_SPHERE: -6.25
OD_AXIS: 066
OD_CYLINDER: -1.50
OS_CYLINDER: -0.50
OS_AXIS: 002
OS_SPHERE: -7.50
OD_ADD: +2.50
OS_ADD: +2.50

## 2025-03-06 ASSESSMENT — CUP TO DISC RATIO
OD_RATIO: 0.35
OS_RATIO: 0.35

## 2025-03-06 ASSESSMENT — EXTERNAL EXAM - RIGHT EYE: OD_EXAM: NORMAL

## 2025-03-06 ASSESSMENT — EXTERNAL EXAM - LEFT EYE: OS_EXAM: NORMAL

## 2025-03-06 NOTE — ASSESSMENT & PLAN NOTE
2+ milky NS OU. Mildly visually significant OU.  Pt educated on findings and importance of UV protection when outdoors. Recommend anti-glare coating in lenses to reduce symptoms of night glare. Discussed option for referral for cataract surgery - but pt defers at this time. Continue to monitor annually at this time. Pt voiced understanding.

## 2025-03-06 NOTE — ASSESSMENT & PLAN NOTE
Mild change from habitual Rx. BCVA mildly limited due to cataracts.  Pt educated on findings. Release Rx for FTW. Discussed adaptation. Recommend continuing with yellow filter anti glare glasses for night vision/glare complaints. Monitor annually. Pt voiced understanding.

## 2025-03-06 NOTE — PROGRESS NOTES
Assessment/Plan   Problem List Items Addressed This Visit       Myopia of both eyes with astigmatism and presbyopia - Primary     Mild change from habitual Rx. BCVA mildly limited due to cataracts.  Pt educated on findings. Release Rx for FTW. Discussed adaptation. Recommend continuing with yellow filter anti glare glasses for night vision/glare complaints. Monitor annually. Pt voiced understanding.            Age-related nuclear cataract of both eyes     2+ milky NS OU. Mildly visually significant OU.  Pt educated on findings and importance of UV protection when outdoors. Recommend anti-glare coating in lenses to reduce symptoms of night glare. Discussed option for referral for cataract surgery - but pt defers at this time. Continue to monitor annually at this time. Pt voiced understanding.

## 2025-05-12 ENCOUNTER — APPOINTMENT (OUTPATIENT)
Dept: PRIMARY CARE | Facility: CLINIC | Age: 60
End: 2025-05-12
Payer: COMMERCIAL

## 2025-05-12 VITALS
BODY MASS INDEX: 36.84 KG/M2 | WEIGHT: 195 LBS | SYSTOLIC BLOOD PRESSURE: 114 MMHG | HEART RATE: 76 BPM | DIASTOLIC BLOOD PRESSURE: 76 MMHG

## 2025-05-12 DIAGNOSIS — Z12.31 ENCOUNTER FOR SCREENING MAMMOGRAM FOR MALIGNANT NEOPLASM OF BREAST: ICD-10-CM

## 2025-05-12 DIAGNOSIS — D21.9 GRANULAR CELL TUMOR: ICD-10-CM

## 2025-05-12 DIAGNOSIS — E78.2 HYPERLIPEMIA, MIXED: ICD-10-CM

## 2025-05-12 DIAGNOSIS — J43.9 PULMONARY EMPHYSEMA, UNSPECIFIED EMPHYSEMA TYPE (MULTI): Primary | ICD-10-CM

## 2025-05-12 DIAGNOSIS — E03.9 HYPOTHYROIDISM, UNSPECIFIED TYPE: ICD-10-CM

## 2025-05-12 DIAGNOSIS — I10 HYPERTENSION, UNSPECIFIED TYPE: ICD-10-CM

## 2025-05-12 DIAGNOSIS — I35.1 AORTIC VALVE INSUFFICIENCY, ETIOLOGY OF CARDIAC VALVE DISEASE UNSPECIFIED: ICD-10-CM

## 2025-05-12 DIAGNOSIS — Z00.00 HEALTH CARE MAINTENANCE: ICD-10-CM

## 2025-05-12 DIAGNOSIS — C73: ICD-10-CM

## 2025-05-12 DIAGNOSIS — E66.09 CLASS 2 OBESITY DUE TO EXCESS CALORIES WITHOUT SERIOUS COMORBIDITY WITH BODY MASS INDEX (BMI) OF 36.0 TO 36.9 IN ADULT: ICD-10-CM

## 2025-05-12 DIAGNOSIS — C73 PAPILLARY MICROCARCINOMA OF THYROID (MULTI): ICD-10-CM

## 2025-05-12 DIAGNOSIS — E66.812 CLASS 2 OBESITY DUE TO EXCESS CALORIES WITHOUT SERIOUS COMORBIDITY WITH BODY MASS INDEX (BMI) OF 36.0 TO 36.9 IN ADULT: ICD-10-CM

## 2025-05-12 PROCEDURE — 3078F DIAST BP <80 MM HG: CPT | Performed by: INTERNAL MEDICINE

## 2025-05-12 PROCEDURE — 99396 PREV VISIT EST AGE 40-64: CPT | Performed by: INTERNAL MEDICINE

## 2025-05-12 PROCEDURE — 3074F SYST BP LT 130 MM HG: CPT | Performed by: INTERNAL MEDICINE

## 2025-05-12 ASSESSMENT — ENCOUNTER SYMPTOMS
SINUS PRESSURE: 0
VOMITING: 0
PALPITATIONS: 0
COUGH: 0
FLANK PAIN: 0
WEAKNESS: 0
LIGHT-HEADEDNESS: 0
BACK PAIN: 0
SEIZURES: 0
BLOOD IN STOOL: 0
WOUND: 0
NECK PAIN: 0
NUMBNESS: 0
POLYPHAGIA: 0
DIARRHEA: 0
DYSURIA: 0
CHEST TIGHTNESS: 0
FATIGUE: 0
RHINORRHEA: 0
COLOR CHANGE: 0
EYE REDNESS: 0
SLEEP DISTURBANCE: 0
FREQUENCY: 0
VOICE CHANGE: 0
HEMATURIA: 0
POLYDIPSIA: 0
ANAL BLEEDING: 0
ACTIVITY CHANGE: 0
WHEEZING: 0
NAUSEA: 0
SORE THROAT: 0
RECTAL PAIN: 0
ARTHRALGIAS: 0
STRIDOR: 0
PHOTOPHOBIA: 0
TROUBLE SWALLOWING: 0
CONSTIPATION: 0
DIZZINESS: 0
SPEECH DIFFICULTY: 0
BRUISES/BLEEDS EASILY: 0
EYE ITCHING: 0
HEADACHES: 0
EYE PAIN: 0
SINUS PAIN: 0
NECK STIFFNESS: 0
ABDOMINAL PAIN: 0
SHORTNESS OF BREATH: 1
MYALGIAS: 0
ADENOPATHY: 0
DIAPHORESIS: 0
FACIAL SWELLING: 0
DIFFICULTY URINATING: 0
CHILLS: 0
ABDOMINAL DISTENTION: 0
JOINT SWELLING: 0
CHOKING: 0
TREMORS: 0
APPETITE CHANGE: 0
FACIAL ASYMMETRY: 0
EYE DISCHARGE: 0

## 2025-05-12 ASSESSMENT — PATIENT HEALTH QUESTIONNAIRE - PHQ9
2. FEELING DOWN, DEPRESSED OR HOPELESS: NOT AT ALL
SUM OF ALL RESPONSES TO PHQ9 QUESTIONS 1 AND 2: 0
1. LITTLE INTEREST OR PLEASURE IN DOING THINGS: NOT AT ALL

## 2025-05-12 NOTE — PROGRESS NOTES
Subjective   Patient ID: JESSICA Haq is a 59 y.o. female who presents for Follow-up.    Patient presents for physical exam.  She has been compliant with her medications, diet and started to exercise.  She has quit smoking!.  She has gained weight.  She overall feels well.  She denies any headaches, no dizziness, no sinus problems, no chest pain but reports baseline dyspnea on exertion..  She denies abdominal pain no nausea vomiting or diarrhea.  She reports no new musculoskeletal complaints.         Review of Systems   Constitutional:  Negative for activity change, appetite change, chills, diaphoresis and fatigue.   HENT:  Negative for congestion, dental problem, drooling, ear discharge, ear pain, facial swelling, hearing loss, mouth sores, nosebleeds, postnasal drip, rhinorrhea, sinus pressure, sinus pain, sneezing, sore throat, tinnitus, trouble swallowing and voice change.    Eyes:  Negative for photophobia, pain, discharge, redness, itching and visual disturbance.   Respiratory:  Positive for shortness of breath. Negative for cough, choking, chest tightness, wheezing and stridor.    Cardiovascular:  Negative for chest pain, palpitations and leg swelling.   Gastrointestinal:  Negative for abdominal distention, abdominal pain, anal bleeding, blood in stool, constipation, diarrhea, nausea, rectal pain and vomiting.   Endocrine: Negative for cold intolerance, heat intolerance, polydipsia, polyphagia and polyuria.   Genitourinary:  Negative for decreased urine volume, difficulty urinating, dysuria, enuresis, flank pain, frequency, genital sores, hematuria and urgency.   Musculoskeletal:  Negative for arthralgias, back pain, gait problem, joint swelling, myalgias, neck pain and neck stiffness.   Skin:  Negative for color change, pallor, rash and wound.   Neurological:  Negative for dizziness, tremors, seizures, syncope, facial asymmetry, speech difficulty, weakness, light-headedness, numbness and headaches.    Hematological:  Negative for adenopathy. Does not bruise/bleed easily.   Psychiatric/Behavioral:  Negative for sleep disturbance.        Objective   /76   Pulse 76   Wt 88.5 kg (195 lb)   LMP 11/03/2021   BMI 36.84 kg/m²     Physical Exam  Constitutional:       General: She is not in acute distress.     Appearance: Normal appearance. She is normal weight. She is not ill-appearing, toxic-appearing or diaphoretic.   HENT:      Head: Normocephalic.      Right Ear: Tympanic membrane, ear canal and external ear normal. There is no impacted cerumen.      Left Ear: Tympanic membrane, ear canal and external ear normal. There is no impacted cerumen.      Nose: Nose normal. No congestion or rhinorrhea.      Mouth/Throat:      Mouth: Mucous membranes are moist.      Pharynx: Oropharynx is clear. No oropharyngeal exudate or posterior oropharyngeal erythema.   Eyes:      General: No scleral icterus.        Right eye: No discharge.         Left eye: No discharge.      Extraocular Movements: Extraocular movements intact.      Conjunctiva/sclera: Conjunctivae normal.      Pupils: Pupils are equal, round, and reactive to light.   Neck:      Vascular: No carotid bruit.   Cardiovascular:      Rate and Rhythm: Normal rate and regular rhythm.      Pulses: Normal pulses.      Heart sounds: Normal heart sounds. No murmur heard.     No friction rub. No gallop.   Pulmonary:      Effort: Pulmonary effort is normal. No respiratory distress.      Breath sounds: Normal breath sounds. No stridor. No wheezing, rhonchi or rales.   Chest:      Chest wall: No tenderness.   Abdominal:      General: Abdomen is flat. There is no distension.      Palpations: Abdomen is soft. There is no mass.      Tenderness: There is no abdominal tenderness. There is no right CVA tenderness, left CVA tenderness or guarding.      Hernia: No hernia is present.   Musculoskeletal:         General: No swelling, tenderness, deformity or signs of injury.       Cervical back: Normal range of motion and neck supple. No rigidity or tenderness.      Right lower leg: No edema.      Left lower leg: No edema.   Lymphadenopathy:      Cervical: No cervical adenopathy.   Skin:     General: Skin is warm.      Coloration: Skin is not jaundiced or pale.      Findings: No bruising, erythema, lesion or rash.   Neurological:      General: No focal deficit present.      Mental Status: She is alert and oriented to person, place, and time. Mental status is at baseline.      Cranial Nerves: No cranial nerve deficit.      Sensory: No sensory deficit.      Motor: No weakness.      Coordination: Coordination normal.      Gait: Gait normal.      Deep Tendon Reflexes: Reflexes normal.   Psychiatric:         Mood and Affect: Mood normal.         Behavior: Behavior normal.         Thought Content: Thought content normal.         Judgment: Judgment normal.         Assessment/Plan   Diagnoses and all orders for this visit:  Pulmonary emphysema, unspecified emphysema type (Multi)-follow-up with pulmonary.  Papillary microcarcinoma of thyroid (Multi)-follow-up with endocrinology  Hyperlipemia, mixed-check of lipid profile  Hypertension, unspecified type-low-salt diet and exercise  Health care maintenance-colonoscopy has been done.  Refuses immunizations.  Will schedule mammogram.  Pap with GYN.  Will check routine labs  -     CBC and Auto Differential; Future  -     Vitamin D 25-Hydroxy,Total (for eval of Vitamin D levels); Future  -     Uric Acid; Future  -     Urinalysis with Reflex Microscopic; Future  -     Albumin-Creatinine Ratio, Urine Random; Future  -     Comprehensive Metabolic Panel; Future  -     Lipid Panel; Future  -     Albumin-Creatinine Ratio, Urine Random; Future  Encounter for screening mammogram for malignant neoplasm of breast  -     BI mammo bilateral screening tomosynthesis; Future  -     BI US breast complete bilateral; Future  Hypothyroidism, unspecified type-follow-up with  endocrinology  Carcinoma of thyroid  Granular cell tumor-follow-up with pulmonary  Aortic valve insufficiency, etiology of cardiac valve disease unspecified-check an echo  -     Transthoracic Echo (TTE) Complete; Future  Tobacco abuse-patient congratulated on her cessation.  CT scan has been done.  Obesity-she is encouraged to diet and exercise  Elevated cardiac score-will modify risk factors.

## 2025-05-12 NOTE — PATIENT INSTRUCTIONS
Please take medication as prescribed.  Follow-up in 3 months.  Obtain fasting blood work and urine.  Schedule your echocardiogram.

## 2025-05-15 DIAGNOSIS — R93.1 AGATSTON CORONARY ARTERY CALCIUM SCORE BETWEEN 200 AND 399: ICD-10-CM

## 2025-05-15 DIAGNOSIS — E78.2 HYPERLIPEMIA, MIXED: Primary | ICD-10-CM

## 2025-05-15 LAB
25(OH)D3+25(OH)D2 SERPL-MCNC: 32 NG/ML (ref 30–100)
ALBUMIN SERPL-MCNC: 4.7 G/DL (ref 3.6–5.1)
ALP SERPL-CCNC: 68 U/L (ref 37–153)
ALT SERPL-CCNC: 32 U/L (ref 6–29)
ANION GAP SERPL CALCULATED.4IONS-SCNC: 9 MMOL/L (CALC) (ref 7–17)
APPEARANCE UR: CLEAR
AST SERPL-CCNC: 21 U/L (ref 10–35)
BASOPHILS # BLD AUTO: 40 CELLS/UL (ref 0–200)
BASOPHILS NFR BLD AUTO: 0.5 %
BILIRUB SERPL-MCNC: 0.4 MG/DL (ref 0.2–1.2)
BILIRUB UR QL STRIP: NEGATIVE
BUN SERPL-MCNC: 22 MG/DL (ref 7–25)
CALCIUM SERPL-MCNC: 9.8 MG/DL (ref 8.6–10.4)
CHLORIDE SERPL-SCNC: 106 MMOL/L (ref 98–110)
CHOLEST SERPL-MCNC: 288 MG/DL
CHOLEST/HDLC SERPL: 6 (CALC)
CO2 SERPL-SCNC: 24 MMOL/L (ref 20–32)
COLOR UR: YELLOW
CREAT SERPL-MCNC: 0.89 MG/DL (ref 0.5–1.05)
EGFRCR SERPLBLD CKD-EPI 2021: 74 ML/MIN/1.73M2
EOSINOPHIL # BLD AUTO: 150 CELLS/UL (ref 15–500)
EOSINOPHIL NFR BLD AUTO: 1.9 %
ERYTHROCYTE [DISTWIDTH] IN BLOOD BY AUTOMATED COUNT: 14.9 % (ref 11–15)
GLUCOSE SERPL-MCNC: 101 MG/DL (ref 65–99)
GLUCOSE UR QL STRIP: NEGATIVE
HCT VFR BLD AUTO: 41.3 % (ref 35–45)
HDLC SERPL-MCNC: 48 MG/DL
HGB BLD-MCNC: 13.3 G/DL (ref 11.7–15.5)
HGB UR QL STRIP: NEGATIVE
KETONES UR QL STRIP: NEGATIVE
LDLC SERPL CALC-MCNC: 203 MG/DL (CALC)
LEUKOCYTE ESTERASE UR QL STRIP: NEGATIVE
LYMPHOCYTES # BLD AUTO: 3579 CELLS/UL (ref 850–3900)
LYMPHOCYTES NFR BLD AUTO: 45.3 %
MCH RBC QN AUTO: 29.1 PG (ref 27–33)
MCHC RBC AUTO-ENTMCNC: 32.2 G/DL (ref 32–36)
MCV RBC AUTO: 90.4 FL (ref 80–100)
MONOCYTES # BLD AUTO: 379 CELLS/UL (ref 200–950)
MONOCYTES NFR BLD AUTO: 4.8 %
NEUTROPHILS # BLD AUTO: 3753 CELLS/UL (ref 1500–7800)
NEUTROPHILS NFR BLD AUTO: 47.5 %
NITRITE UR QL STRIP: NEGATIVE
NONHDLC SERPL-MCNC: 240 MG/DL (CALC)
PH UR STRIP: ABNORMAL [PH] (ref 5–8)
PLATELET # BLD AUTO: 421 THOUSAND/UL (ref 140–400)
PMV BLD REES-ECKER: 8.7 FL (ref 7.5–12.5)
POTASSIUM SERPL-SCNC: 4.5 MMOL/L (ref 3.5–5.3)
PROT SERPL-MCNC: 7.5 G/DL (ref 6.1–8.1)
PROT UR QL STRIP: NEGATIVE
RBC # BLD AUTO: 4.57 MILLION/UL (ref 3.8–5.1)
SODIUM SERPL-SCNC: 139 MMOL/L (ref 135–146)
SP GR UR STRIP: 1.02 (ref 1–1.03)
TRIGL SERPL-MCNC: 195 MG/DL
URATE SERPL-MCNC: 7.7 MG/DL (ref 2.5–7)
WBC # BLD AUTO: 7.9 THOUSAND/UL (ref 3.8–10.8)

## 2025-05-19 DIAGNOSIS — E78.5 HYPERLIPIDEMIA, UNSPECIFIED HYPERLIPIDEMIA TYPE: ICD-10-CM

## 2025-05-19 DIAGNOSIS — Z00.00 HEALTHCARE MAINTENANCE: ICD-10-CM

## 2025-05-19 DIAGNOSIS — D21.9 GRANULAR CELL TUMOR: ICD-10-CM

## 2025-05-19 DIAGNOSIS — F17.210 NICOTINE DEPENDENCE, CIGARETTES, UNCOMPLICATED: ICD-10-CM

## 2025-05-19 PROCEDURE — RXMED WILLOW AMBULATORY MEDICATION CHARGE

## 2025-05-19 RX ORDER — ROSUVASTATIN CALCIUM 40 MG/1
40 TABLET, COATED ORAL DAILY
Qty: 90 TABLET | Refills: 3 | Status: SHIPPED | OUTPATIENT
Start: 2025-05-19 | End: 2026-05-19

## 2025-05-20 PROCEDURE — RXMED WILLOW AMBULATORY MEDICATION CHARGE

## 2025-05-20 RX ORDER — VARENICLINE TARTRATE 0.5 (11)-1
KIT ORAL
Qty: 53 EACH | Refills: 0 | Status: SHIPPED | OUTPATIENT
Start: 2025-05-20

## 2025-05-21 ENCOUNTER — PHARMACY VISIT (OUTPATIENT)
Dept: PHARMACY | Facility: CLINIC | Age: 60
End: 2025-05-21
Payer: COMMERCIAL

## 2025-05-21 ENCOUNTER — APPOINTMENT (OUTPATIENT)
Dept: OPHTHALMOLOGY | Facility: CLINIC | Age: 60
End: 2025-05-21
Payer: COMMERCIAL

## 2025-05-22 PROCEDURE — RXMED WILLOW AMBULATORY MEDICATION CHARGE

## 2025-05-22 RX ORDER — VALACYCLOVIR HYDROCHLORIDE 500 MG/1
500 TABLET, FILM COATED ORAL DAILY
Qty: 90 TABLET | Refills: 0 | Status: SHIPPED | OUTPATIENT
Start: 2025-05-22 | End: 2026-05-22

## 2025-05-28 ENCOUNTER — HOSPITAL ENCOUNTER (OUTPATIENT)
Dept: CARDIOLOGY | Facility: HOSPITAL | Age: 60
Discharge: HOME | End: 2025-05-28
Payer: COMMERCIAL

## 2025-05-28 ENCOUNTER — PHARMACY VISIT (OUTPATIENT)
Dept: PHARMACY | Facility: CLINIC | Age: 60
End: 2025-05-28
Payer: COMMERCIAL

## 2025-05-28 DIAGNOSIS — I35.1 AORTIC VALVE INSUFFICIENCY, ETIOLOGY OF CARDIAC VALVE DISEASE UNSPECIFIED: ICD-10-CM

## 2025-05-28 LAB
AORTIC VALVE PEAK VELOCITY: 1.95 M/S
AV PEAK GRADIENT: 15 MMHG
AVA (PEAK VEL): 2.48 CM2
EJECTION FRACTION APICAL 4 CHAMBER: 68.4
EJECTION FRACTION: 69 %
LEFT ATRIUM VOLUME AREA LENGTH INDEX BSA: 32.3 ML/M2
LEFT VENTRICLE INTERNAL DIMENSION DIASTOLE: 4.2 CM (ref 3.5–6)
LEFT VENTRICULAR OUTFLOW TRACT DIAMETER: 2 CM
MITRAL VALVE E/A RATIO: 0.93
RIGHT VENTRICLE FREE WALL PEAK S': 15.1 CM/S
RIGHT VENTRICLE PEAK SYSTOLIC PRESSURE: 22.9 MMHG
TRICUSPID ANNULAR PLANE SYSTOLIC EXCURSION: 2.2 CM

## 2025-05-28 PROCEDURE — 93306 TTE W/DOPPLER COMPLETE: CPT

## 2025-05-28 PROCEDURE — 93306 TTE W/DOPPLER COMPLETE: CPT | Performed by: INTERNAL MEDICINE

## 2025-05-29 ENCOUNTER — APPOINTMENT (OUTPATIENT)
Dept: RADIOLOGY | Facility: HOSPITAL | Age: 60
End: 2025-05-29
Payer: COMMERCIAL

## 2025-05-30 NOTE — PROGRESS NOTES
Primary Care Physician: Fran Gonzales MD      Date of Visit: 05/31/2025  8:00 AM EDT  [place] [facility] [place] [address]   Type of Visit: Established Patient     HPI / Summary:   Vinh Haq is a very pleasant 59 y.o. female with HTN, prediabetes, hyperlipidemia ( At FH levels)  and high CAC ( 325.8)  who comes to establish cardiac care    CAD risk factors: +HTN, hyperlipidemia, prediabetes and current smoker( down to 2 cigs per day on chantix) and + FHx of CAD ( mom)   Pt has been on  rosuvastatin for 2 years at this dose without myalgias though occasionally gets cramps in her calves if she forgets to take her high dose vitamin D supplements. When she takes the vitamin D she does not have an issue. Pt has been working on smoking cessation and is now down to 2 cigs per day since starting chantix and she feels she can be successful at quitting using this. Pt exercises riding bike 30 minutes daily and walking as well. No CP or SOB at that level of exercise. Denies palpitations presyncope or syncope . Pt checks her BP at home and her systolic BP runs in 120smmHg. No ACEi cough  Work up at   CT for CAC   10/18/2024: total 325,  .1,  LCx 2.2,   RCA 90.5  Echocardiogram 5/28/2025:  LVEF 69%,  no RWM.  normal RV, trileaflet AV with calcified leaflets  with mild to moderate AI ( centrally originating centrally directed). IN comparison to prior echo the AI is unchanged and the LV size remains normal  Exercise stress 1/15/2025: completed 8.1 METS ( prior stress test only completed 6.8 METS) with no inducible ischemia and LVEF at rest 65-70% increased to > 75% no RWMA No ischemia. Limited resting color Doppler images with mild AI.      ROS: Full 10 pt review of symptoms of negative unless discussed above.     Problems:   Problem List[1]  Medical History:   Medical History[2]  Surgical Hx:   Surgical History[3]   Social Hx:   Tobacco Use: High Risk (5/31/2025)    Patient History     Smoking Tobacco Use: Some Days      Smokeless Tobacco Use: Never     Passive Exposure: Not on file     Alcohol Use: Not At Risk (11/15/2024)    AUDIT-C     Frequency of Alcohol Consumption: Never     Average Number of Drinks: Patient does not drink     Frequency of Binge Drinking: Never     Family Hx:   Family History[4]   Exam:   Vitals:   Vitals:    05/31/25 0756 05/31/25 0823   BP: 155/84 133/84   BP Location: Right arm Right arm   Patient Position: Sitting Sitting   BP Cuff Size: Large adult    Pulse: 75    SpO2: 97%    Weight: 90.7 kg (200 lb)      Wt Readings from Last 5 Encounters:   05/31/25 90.7 kg (200 lb)   05/12/25 88.5 kg (195 lb)   11/21/24 85.3 kg (188 lb)   11/15/24 87.5 kg (193 lb)   04/22/24 86.2 kg (190 lb)      Constitutional:       Appearance: Healthy appearance. Not in distress.   Pulmonary:      Effort: Pulmonary effort is normal.      Breath sounds: Normal breath sounds.   Cardiovascular:      Normal rate. Regular rhythm. S1 with normal intensity. S2 with normal intensity.       Murmurs: There is a grade 1/6 systolic murmur at the URSB.   Pulses:     Intact distal pulses.   Edema:     Peripheral edema absent.   Abdominal:      General: Bowel sounds are normal.      Palpations: Abdomen is soft.   Neurological:      Mental Status: Alert and oriented to person, place and time.       Labs:   Recent Labs     05/14/25 0821 11/16/24  0745 11/15/24  1740 04/16/24  0827 06/07/23  1023 10/05/22  0805 10/11/21  1055 03/14/21  0149   WBC 7.9 7.6 9.8 8.4 10.2 8.3 10.2 11.2   HGB 13.3 12.5 12.8 13.5 12.9 13.3 12.8 12.5   HCT 41.3 38.5 38.7 41.8 39.1 41.0 39.1 37.8   * 355 380 424 358 397 414 416   MCV 90.4 89 87 88 87 90 89 88     Recent Labs     05/14/25  0821 11/16/24  0745 11/15/24  1740 04/16/24  0827 06/07/23  1023 10/05/22  0805 10/11/21  1055 03/14/21  0149    140 138 139 141 140 141 141   K 4.5 4.3 4.0 4.5 4.6 4.6 4.5 4.1    108* 107 105 108* 107 105 107   BUN 22 20 23 19 14 19 16 29*   CREATININE 0.89 0.74  "0.82 0.84 0.83 0.81 0.88 0.89      Recent Labs     11/15/24  1740 03/14/21  0149   HGBA1C 6.2*  --    BNP 12 8     Lab Results   Component Value Date    CHOL 288 (H) 05/14/2025    HDL 48 (L) 05/14/2025    LDLCALC 203 (H) 05/14/2025    TRIG 195 (H) 05/14/2025   Note above lipid panel done on rosuvastatin 40 mg daily.     Notable Studies: imaging personally reviewed and summarized by me below  EKG:  NSR at 77 bpm, normal EKG    Imaging:  === Results for orders placed during the hospital encounter of 10/18/24 ===    CT cardiac scoring wo IV contrast [[id number]] 10/18/2024    Status: Normal  1. Coronary artery calcium score of 325.8*.  2. Partially imaged right breast soft tissue lesion as detailed  above, better evaluated on dedicated CT of the chest from 05/09/2024  and correlate with recent mammographic findings from 04/04/2024.    *Coronary artery calcium scoring may be helpful in predicting the  risk for future coronary heart disease events.  According to the  American College of [employer] Foundation Clinical Expert Consensus  Task Force, such testing provides important prognostic information in  patients with more than one coronary heart disease risk factor. The  coronary artery calcium score correlates with the annual risk of a  non-fatal myocardial infarction or coronary heart disease death.    Coronary artery score            Annual Risk    0-99                             0.4%  100-399                        1.3%  >400                            2.4%    These three \"breakpoints\" correspond to lower, intermediate and high  risk states for future coronary events.  Such information should be  used, along with appropriate clinical judgment, to make decisions  regarding the intensity of risk factor management strategies to treat  blood lipids and to modify other non-lipid coronary risk factors.    Reference: Crowley P et al. Circulation.  2007; 115:402-426    MACRO:  None    Signed by: Tae Cheatham " 10/18/2024 8:21 AM  Dictation workstation:   [id number]   The score and distribution of calcium in the coronary arteries is as  follows:      LM 0,  .1,  LCx 2.2,  RCA 90.5,      Total 325.8    Echo:     Transthoracic Echo (TTE) Complete 05/28/2025   PHYSICIAN INTERPRETATION:  Left Ventricle: Left ventricular ejection fraction is normal, calculated by Snell's biplane at 69%. There are no regional left ventricular wall motion abnormalities. The left ventricular cavity size is normal. There is normal septal and normal posterior left ventricular wall thickness. There is a false tendon visualized in the left ventricle. There is left ventricular concentric remodeling. Spectral Doppler shows a normal pattern of left ventricular diastolic filling.  Left Atrium: The left atrial size is normal.  Right Ventricle: The right ventricle is normal in size. There is normal right ventricular global systolic function.  Right Atrium: The right atrium is normal in size.  Aortic Valve: The aortic valve is trileaflet. There is mild aortic valve cusp calcification. There is mild to moderate aortic valve regurgitation. The peak instantaneous gradient of the aortic valve is 15 mmHg. Trileaflet AV with thickened and calcified leaflet tips with mild to moderate centrally originating, centrally directed AI.  Mitral Valve: The mitral valve is mildly thickened. There is mild thickening of the anterior mitral valve leaflet. There is mild mitral annular calcification. There is trace mitral valve regurgitation.  Tricuspid Valve: The tricuspid valve is structurally normal. There is trace tricuspid regurgitation. The Doppler estimated RVSP is within normal limits at 22.9 mmHg. Reported right ventricular systolic pressure may be underestimated due to incomplete or suboptimal Doppler envelope.  Pulmonic Valve: The pulmonic valve is not well visualized. The pulmonic valve regurgitation was not well visualized.  Pericardium: There is no  [FreeTextEntry1] : Ms. Montejo is a 63 year old English/Ukrainian speaking female who presents for post treatment evaluation appointment.   She is unaccompanied for today's visit.     Diagnosis:  pT1c pN0 LEFT Breast,  Invasive Ductal Carcinoma (4.0 mm), Invasive Lobular Carcinoma (14.0 mm) and DCIS, high nuclear grade.  All margins negative.  Two lymph nodes with no tumor present (0/2).  ER + (99%) IA + (99%) HER2 -  Oncotype DX Recurrence Score:  6  (Lobular)  Oncotype DX Recurrence Score:  15 (Ductal)   HPI :   10/20/22 - Bilateral Screening Mammogram/US:  Left breast spiculated mass suspicious for malignancy.  No sonographic correlate identified on screening.  Left breast asymmetry.  No suspicious right breast mammographic findings.  No suspicious sonographic findings seen in either breast.  Additional imaging recommended.   BIRADS 0  11/9/22 -  Unilateral LEFT Diagnostic Callback Mammogram/US:  Left upper outer quadrant suspicious mass, tomosynthesis guided biopsy recommended.  BIRADS 5   11/23/22 - underwent LEFT Breast, 2:00 Biopsy:  [facility] - [facility] review  Invasive moderately differentiated Lobular Carcinoma (9 mm).  ER + (>95%) IA + (>95%) HER2 -   12/30/22 - Breast MRI:  IMPRESSION:    Known malignancy left breast. As detailed in full report, wide excision is recommended. If breast conservation therapy is elected recommend consideration of biopsy of additional site eg that in the inferior central breast with expectant management of focus upper outer quadrant (6 month MRI follow-up if benign results are obtained) No MRI evidence of malignancy right breast.  BIRADS 4A   *** ADDENDUM # 1 ***   Note: Patient is status post MRI guided biopsy 1/12/2023 of the left inferior breast revealing DCIS. Known invasive lobular carcinoma left breast.  If will change surgical management recommend consideration of biopsy of additional prominent focus noted upper slightly outer left breast.  1/12/23 - underwent LEFT Breast, Inferior central MRI guided Biopsy:  Pathology -  DCIS, solid and cribriform pattern with ow grade nuclear atypia.  Uninvolved breast tissue shows non-proliferative fibrocystic change with apocrine metaplasia.  Microcalcifications not identified.  ER + (95%) IA + (95%)    2/2/23 - underwent LEFT Breast, Upper slightly outer MRI guided Biopsy:  Pathology - Breast tissue with focal atypical duct hyperplasia (multiple additional deeper levels were examined).  Dense stromal fibrosis.  Fibrocystic changes with calcifications.    3/13/23 - underwent 3 site LEFT Breast Lumpectomies and LEFT Axillary [place] Node Biopsy (Dr. Barajas - surgeon):  Pathology - - # 1. LEFT Breast, Lower Inner Quadrant Lumpectomy:  DCIS, high nuclear grade, solid with comedonecrosis.  Focal atypical ductal hyperplasia.  Biopsy site change.   - # 2. LEFT Breast, Upper outer Quadrant Lumpectomy:  Invasive Ductal Carcinoma (4.0 mm), moderately differentiated.  Biopsy site change. Nonproliferative fibrocystic change.   Oncotype DX Recurrence Score:  15 (Ductal)  - # 3. LEFT Breast, Central Lumpectomy:  Invasive Lobular Carcinoma (14.0 mm), classic type. LCIS.  Biopsy site change.  Oncotype DX Recurrence Score:  6  -  LEFT Axillary [place] Node Biopsy:  No tumor present in 2 Lymph nodes (0/2).   4/17/23 - saw Dr. Barajas (surgeon) in follow up ....  healing well.  Two Oncotype DX tests requested given two separate malignancies.  Referrals to medical and radiation oncology.    5/12/23 - presented in consultation for discussion of radiation therapy options.  Ms. Montejo is feeling well today and has healed from her surgery.  Denies any pain.  She looks forward to next steps in her treatment.  She is to also see Dr. Bartlett ([facility]) today in consultation.  Recommended adjuvant radiation to the left breast 4240cGy in 16 fractions.   5/12/23 - saw Dr. Bartlett ([facility]) in consultation ...  no role for chemotherapy recommended endocrine therapy with aromatase inhibitor (Anastrozole) after completion of radiation therapy.   6/2/23 - 6/23/23:  Received RADIATION Therapy to LEFT Breast, 4240 cGy in 16 fx  Tolerated treatment well, had Grade 2 Radiation dermatitis in the left inframammary fold   7/2023 - started on Anastrozole (Dr. Bartlett - [facility])    7/31/23 - presents for post treatment evaluation appointment.  Ms. Montejo is feeling well and has healed well after her radiation therapy.  She has some mild residual hyperpigmentation and is moisturizing her skin at least once a day now.  Denies any pain. She continues to follow with Dr. Barajas (surgeon) last seen 7/5/23 as well as Dr. Bartlett ([facility]) next visit 8/17/23, she is taking Anastrozole.  Next breast imaging is due in December 2023.   pericardial effusion noted.  Aorta: The aortic root is normal.  Systemic Veins: The inferior vena cava appears normal in size, with IVC inspiratory collapse greater than 50%.  In comparison to the previous echocardiogram(s): Compared with study dated 3/5/2020, The AV was better seen today, though the AV leaflet tips appear a little more calcified/thickened than before. AI appears similar on side by side comparison. LV cavity size remains normal.        CONCLUSIONS:   1. Left ventricular ejection fraction is normal, calculated by Snell's biplane at 69%.   2. There is normal right ventricular global systolic function.   3. Right ventricular systolic pressure is within normal limits.   4. Mild to moderate aortic valve regurgitation.   5. Compared with study dated 3/5/2020, The AV was better seen today, though the AV leaflet tips appear a little more calcified/thickened than before. AI appears similar on side by side comparison. LV cavity size remains normal.    Stress echo 1/15/2025-  Summary:   1. The resting ejection fraction was estimated at 65 to 70% with a peak exercise ejection fraction estimated at >75%.   2. Normal global left ventricular systolic function.   3. Mild AI on resting images.   4. Compared with the prior stress echo from 12/17/2020, the patinet's funcitonal capacity wa better on today's exam. Previously completed 6.8METs and today completed 8.1 METs.   5. Adequate level of stress achieved.   6. No clinical, echocardiographic or electrocardiographic evidence for ischemia at a maximal workload.   7. No ischemia by ECG at max workload.   8. Normal Stress Test.    Current Outpatient Medications   Medication Instructions    acetaminophen (TYLENOL) 650 mg, oral, Every 4 hours PRN    aspirin 81 mg, oral, Daily    levothyroxine (SYNTHROID, LEVOXYL) 137 mcg, oral, Daily before breakfast    lisinopril 40 mg, oral, Daily    nicotine (Nicoderm CQ) 7 mg/24 hr patch 1 patch, transdermal, Every 24 hours     Repatha SureClick 140 mg, subcutaneous, Every 14 days    rosuvastatin (Crestor) 40 mg tablet TAKE 1 TABLET BY MOUTH ONCE DAILY    valACYclovir (Valtrex) 500 mg tablet TAKE 1 TABLET BY MOUTH ONCE DAILY    varenicline tartrate (Chantix ALETHEA) 0.5 mg (11)- 1 mg (42) tablet Days 1 to 3: 0.5 mg by mouth once a day. Days 4 to 7: 0.5 mg 2 times per day. Days 8 to end of treatment: 1 mg 2 times per day as directed on package.     Impressions and Plan:    Pt is a bernardino 59 year old AA woman with familial hyperlipidemia, HTN + Fhx of CAD , current smoker though  working on quitting found to have a high CAC( 325) who  is exercising and remains asymptomatic from a cardiac perspective. She had a recent exercise stress echo without any inducible ischemia with an improved exercise capacity compared with the prior stress test. Her LDL target is < 70 given her CAD by CAC, and she is far from target on max dose rosuvastatin. Will add PCSK9i ( Repatha)  and recheck lipid panel 1 week prior to next visit.   Plan  CAD- continue aspirin 81 mg daily and rosuvastatin 40 mg daily. Will need to add PCSK9i to reach LDL target < 70  HTN- continue lisinopril and follow home BP and HR and record for review at next visit  Smoking- continue chantix and work on complete cessation  Familial hyperlipidemia- continue rosuvastatin 40 mg daily and start repatha 140 mg every 2 weeks. Recheck lipids 1 week prior to next visit.   Continue current level of exercise.    return to clinic in 3-4 months. Lipid panel 1 week prior to visit.   Patient Instructions:  If you have any questions or need cardiac medication refills, please call my office at [phone],      To reach my office please call [phone]  To schedule an appointment call [phone].          ____________________________________________________________  Adye Nath MD  Division of Cardiovascular Medicine  [facility]          [1]    Patient Active Problem List  Diagnosis    Blepharitis of both eyes    Carcinoma of thyroid    Granular cell tumor    Heart murmur    Hordeolum internum of left upper eyelid    Hyperlipemia, mixed    Hypertension    Hypothyroidism    Internal hordeolum of left eye    Left breast lump    Lump or mass in breast    Lung nodule    Lung tumor    Mild aortic regurgitation    Myopia of both eyes with astigmatism and presbyopia    Nicotine dependence, unspecified, uncomplicated    Nontoxic multinodular goiter    Papillary microcarcinoma of thyroid (Multi)    Perimenopausal menorrhagia    Postmenopausal bleeding    Primary genital herpes simplex infection    Primary osteoarthritis of left knee    Right shoulder pain    New onset headache    Skin lesion    Subclinical hyperthyroidism    Vaginal discharge    Vaginal yeast infection    Vitamin D deficiency    Thyroid cancer (Multi)    Sinus headache    Pain, joint, shoulder    Lumbar strain    Fibrocystic breast    Class 1 obesity with body mass index (BMI) of 34.0 to 34.9 in adult    Superficial punctate keratitis of left eye    Chest pain    Age-related nuclear cataract of both eyes    Pulmonary emphysema, unspecified emphysema type (Multi)   [2]   Past Medical History:  Diagnosis Date    Acute candidiasis of vulva and vagina 11/30/2015    Vaginal yeast infection    Personal history of other diseases of the female genital tract 11/12/2021    History of postmenopausal bleeding    Personal history of other diseases of urinary system 02/16/2015    History of hematuria    Personal history of other medical treatment 10/21/2019    History of screening mammography   [3]   Past Surgical History:  Procedure Laterality Date    COLONOSCOPY  07/11/2017    Complete Colonoscopy    COLONOSCOPY  11/2024    rpt 11-34    OTHER SURGICAL HISTORY  02/16/2015    Biopsy Thyroid Using Percutaneous Core Needle    OTHER SURGICAL HISTORY  07/15/2020    Thyroidectomy total   [4]   Family  History  Problem Relation Name Age of Onset    Coronary artery disease Mother      Diabetes Sister      Breast cancer Mother's Sister  60        x2 aunts    Throat cancer Mother's Brother      Colon cancer Other Maternal Cousin

## 2025-05-31 ENCOUNTER — OFFICE VISIT (OUTPATIENT)
Dept: CARDIOLOGY | Facility: CLINIC | Age: 60
End: 2025-05-31
Payer: COMMERCIAL

## 2025-05-31 VITALS
OXYGEN SATURATION: 97 % | HEART RATE: 75 BPM | BODY MASS INDEX: 37.79 KG/M2 | DIASTOLIC BLOOD PRESSURE: 84 MMHG | SYSTOLIC BLOOD PRESSURE: 133 MMHG | WEIGHT: 200 LBS

## 2025-05-31 DIAGNOSIS — R73.03 PREDIABETES: ICD-10-CM

## 2025-05-31 DIAGNOSIS — E78.49 FAMILIAL HYPERLIPIDEMIA: ICD-10-CM

## 2025-05-31 DIAGNOSIS — I10 ESSENTIAL (PRIMARY) HYPERTENSION: Primary | ICD-10-CM

## 2025-05-31 DIAGNOSIS — R93.1 AGATSTON CORONARY ARTERY CALCIUM SCORE BETWEEN 200 AND 399: ICD-10-CM

## 2025-05-31 DIAGNOSIS — E78.2 HYPERLIPEMIA, MIXED: ICD-10-CM

## 2025-05-31 DIAGNOSIS — F17.200 SMOKER: ICD-10-CM

## 2025-05-31 PROCEDURE — 99204 OFFICE O/P NEW MOD 45 MIN: CPT | Performed by: INTERNAL MEDICINE

## 2025-05-31 PROCEDURE — 93005 ELECTROCARDIOGRAM TRACING: CPT | Performed by: INTERNAL MEDICINE

## 2025-05-31 PROCEDURE — 3079F DIAST BP 80-89 MM HG: CPT | Performed by: INTERNAL MEDICINE

## 2025-05-31 PROCEDURE — 99212 OFFICE O/P EST SF 10 MIN: CPT | Performed by: INTERNAL MEDICINE

## 2025-05-31 PROCEDURE — 3075F SYST BP GE 130 - 139MM HG: CPT | Performed by: INTERNAL MEDICINE

## 2025-05-31 RX ORDER — CHOLECALCIFEROL (VITAMIN D3) 1250 MCG
1.25 TABLET ORAL
Qty: 52 TABLET | Refills: 0 | Status: SHIPPED | OUTPATIENT
Start: 2025-05-31 | End: 2025-05-31 | Stop reason: WASHOUT

## 2025-05-31 RX ORDER — CHOLECALCIFEROL (VITAMIN D3) 1250 MCG
1250 TABLET ORAL
COMMUNITY

## 2025-05-31 RX ORDER — EVOLOCUMAB 140 MG/ML
140 INJECTION, SOLUTION SUBCUTANEOUS
Qty: 6 EACH | Refills: 0 | Status: SHIPPED | OUTPATIENT
Start: 2025-05-31

## 2025-05-31 ASSESSMENT — PAIN SCALES - GENERAL: PAINLEVEL_OUTOF10: 0-NO PAIN

## 2025-05-31 NOTE — PATIENT INSTRUCTIONS
Hypertension- BP appears to be well controlled at home. Continue your current regimen including lisinopril 40 mg daily   Familial hyperlipidemia- LDL target < 70. Far from target on max dose rosuvastatin. Continue rosuvastatin 40 mg daily and will add repatha injections 140 mg every 2 weeks. Will recheck fasting lipid panel 1 week prior to next visit.   Coronary artery calcium score of 325- continue aspirin 81 mg daily and rosuvastatin 40 mg daily and adding repatha as above. Need to optimize all risk factors including smoking cessation, optimize  BP and reach LDL target < 70. Luckily recent exercise stress echo showed no ischemia( no evidence of significant coronary artery blockages)   Prediabetes- please work on decreasing your carbohydrate intake. This will also help with elevated triglycerides.   Continue current level of exercise   Current smoker- continue to use chantix as it is helping you quit smoking. Please aim for total cessation by next visit ( you are only currently smoking 2 cigarettes per day)   Return to clinic in 3-4 months with fasting lipid panel 1 week prior to visit.

## 2025-06-02 ENCOUNTER — PATIENT MESSAGE (OUTPATIENT)
Dept: CARDIOLOGY | Facility: HOSPITAL | Age: 60
End: 2025-06-02
Payer: COMMERCIAL

## 2025-06-02 DIAGNOSIS — E78.49 FAMILIAL HYPERLIPIDEMIA: Primary | ICD-10-CM

## 2025-06-03 LAB
ATRIAL RATE: 77 BPM
P AXIS: 62 DEGREES
P OFFSET: 202 MS
P ONSET: 138 MS
PR INTERVAL: 170 MS
Q ONSET: 223 MS
QRS COUNT: 13 BEATS
QRS DURATION: 76 MS
QT INTERVAL: 390 MS
QTC CALCULATION(BAZETT): 441 MS
QTC FREDERICIA: 423 MS
R AXIS: 11 DEGREES
T AXIS: 27 DEGREES
T OFFSET: 418 MS
VENTRICULAR RATE: 77 BPM

## 2025-06-10 ENCOUNTER — APPOINTMENT (OUTPATIENT)
Dept: PHARMACY | Facility: HOSPITAL | Age: 60
End: 2025-06-10
Payer: COMMERCIAL

## 2025-06-10 NOTE — PROGRESS NOTES
"  Pharmacist Clinic: Cardiology Management    Vinh Haq \"VINH SOSA\" is a 59 y.o. female was referred to Clinical Pharmacy Team for cholesterol management.     Referring Provider: Ayde Nath MD    THIS IS A NEW PATIENT APPOINTMENT. PATIENT WILL BE ESTABLISHING CARE WITH CLINICAL PHARMACY.    Appointment was completed by *** who was reached at ***.    Allergies Reviewed? Yes    Allergies[1]    Medical History[2]    Medications Ordered Prior to Encounter[3]      RELEVANT LAB RESULTS:  Lab Results   Component Value Date    BILITOT 0.4 05/14/2025    CALCIUM 9.8 05/14/2025    CO2 24 05/14/2025     05/14/2025    CREATININE 0.89 05/14/2025    GLUCOSE 101 (H) 05/14/2025    ALKPHOS 68 05/14/2025    K 4.5 05/14/2025    PROT 7.5 05/14/2025     05/14/2025    AST 21 05/14/2025    ALT 32 (H) 05/14/2025    BUN 22 05/14/2025    ANIONGAP 9 05/14/2025    PHOS 3.5 06/07/2023    ALBUMIN 4.7 05/14/2025    LIPASE 31 11/15/2024    GFRF 81 06/07/2023     Lab Results   Component Value Date    TRIG 195 (H) 05/14/2025    CHOL 288 (H) 05/14/2025    LDLCALC 203 (H) 05/14/2025    HDL 48 (L) 05/14/2025     No results found for: \"BMCBC\", \"CBCDIF\"     PHARMACEUTICAL ASSESSMENT:    MEDICATION RECONCILIATION    Was a medication reconciliation completed at this visit? Yes  Home Pharmacy Reviewed? Yes, describe: [facility]    Added:  - ***  Changed:  - ***  Removed:  - ***    Drug Interactions? {YES-DESCRIBE/NO:31190}    Medication Documentation Review Audit       Reviewed by Ayde Nath MD (Physician) on 05/31/25 at 0822      Medication Order Taking? Sig Documenting Provider Last Dose Status   acetaminophen (Tylenol) 325 mg tablet [id number] Yes Take 2 tablets (650 mg) by mouth every 4 hours if needed for mild pain (1 - 3). Asif Paz, APRN-CNP  Active   aspirin 81 mg EC tablet [id number] Yes Take 1 tablet (81 mg) by mouth once daily. Asif Paz, APRN-CNP  Active   levothyroxine (Synthroid, Levoxyl) 137 mcg tablet " [id number] Yes Take 1 tablet (137 mcg) by mouth once daily in the morning. Take before meals. Sobia Smith MD  Active     Discontinued 25 0757   lisinopril 40 mg tablet [id number] Yes Take 1 tablet (40 mg) by mouth once daily. Fran Gonzales MD  Active   nicotine (Nicoderm CQ) 7 mg/24 hr patch [id number]  Place 1 patch over 24 hours on the skin once every 24 hours for 15 days. Asif Paz, APRN-CNP   24 2359   rosuvastatin (Crestor) 40 mg tablet [id number] Yes TAKE 1 TABLET BY MOUTH ONCE DAILY Fran Gonzales MD  Active   valACYclovir (Valtrex) 500 mg tablet [id number] Yes TAKE 1 TABLET BY MOUTH ONCE DAILY Ana Patricia, APRN-[facility]  Active   varenicline tartrate (Chantix ALETHEA) 0.5 mg (11)- 1 mg (42) tablet [id number] Yes Days 1 to 3: 0.5 mg by mouth once a day. Days 4 to 7: 0.5 mg 2 times per day. Days 8 to end of treatment: 1 mg 2 times per day as directed on package. Fran Camargo MD MPH  Active                    DISEASE MANAGEMENT ASSESSMENT:     HYPERCHOLESTEROLEMIA ASSESSMENT    RECENT LIPID PANEL (DATE): 25  Lab Results   Component Value Date    TRIG 195 (H) 2025    CHOL 288 (H) 2025    LDLCALC 203 (H) 2025    HDL 48 (L) 2025          ASCVD SCORE: The 10-year ASCVD risk score (Valerio HARVEY, et al., 2019) is: 22.4%    Values used to calculate the score:      Age: 59 years      Sex: Female      Is Non- : Yes      Diabetic: No      Tobacco smoker: Yes      Systolic Blood Pressure: 133 mmHg      Is BP treated: Yes      HDL Cholesterol: 48 mg/dL      Total Cholesterol: 288 mg/dL  Coronary Heart Disease (MI, angina, coronary artery stenosis): Yes   History of ischemic stroke? No   History of carotid artery stenosis? No   Peripheral artery disease? No   ASCVD RISK FACTORS:    CKD? No   Diabetes? No   HTN? Yes   Persistently elevated LDL? Yes   Elevated triglycerides? Yes   Inflammatory diseases (rheumatoid arthritis, psoriasis, HIV)?  No    CURRENT PHARMACOTHERAPY  - Statin? Yes, describe: rosuvastatin 40mg every day   - Ezetimibe? No  - PCSK9-I? No  - Other lipid lowering agents? No      RELEVANT PAST MEDICAL HISTORY:   CAD, familial HLD, HTN, nicotine dependence     ASSESSMENT    Affordability/Accessibility: Repatha $150/90ds   Adherence/Organization: ***  Adverse Effects: ***  Recent Hospitalizations: No ***  Diet: ***  Exercise: {YES-DESCRIBE/NO:95588}  Tobacco Use: {YES-DESCRIBE/NO:29470}  Alcohol Use: {YES-DESCRIBE/NO:75619}  Next Lipid Panel: ~12 weeks after starting Repatha       EDUCATION/COUNSELING:  - Counseled patient on MOA, expectations, side effects, duration of therapy, contraindications, administration, and monitoring parameters  - Answered all patient questions and concerns  - ***    DISCUSSION/NOTES:   ***    ASSESSMENT:   Patient Assistance Program (PAP)    Application for program to be submitted for the following medications: Repatha     Prescription Insurance:  Yes   Paid Test Claim:  Yes   County of Permanent Address:  [facility]    Members of Household:  1 ***   Files Taxes:  {YES/NO:77671}     Patient will be {financialpaperwork:29822}    Patient verbally reports monthly or yearly income which is less than 400% federal poverty level    Patient aware this process may take up to 6 weeks.     If approved medication must be filled through [facility] and MEDICATION WILL BE MAILED TO PATIENT.       Assessment/Plan   Problem List Items Addressed This Visit    None        RECOMMENDATIONS/PLAN:    ***    Last Appnt with Referring Provider: 5/31/25  Next Appnt with Referring Provider: 10/16/25  Clinical Pharmacist follow up: ***  VAF/Application Expiration: {YES/DATE/NO:80722}  Type of Encounter: Virtual    Alisia Bardales, PharmD    Verbal consent to manage patient's drug therapy was obtained from the patient . They were informed they may decline to participate or withdraw from participation in pharmacy  services at any time.    Continue all meds under the continuation of care with the referring provider and clinical pharmacy team.           [1] No Known Allergies  [2]   Past Medical History:  Diagnosis Date    Acute candidiasis of vulva and vagina 11/30/2015    Vaginal yeast infection    Personal history of other diseases of the female genital tract 11/12/2021    History of postmenopausal bleeding    Personal history of other diseases of urinary system 02/16/2015    History of hematuria    Personal history of other medical treatment 10/21/2019    History of screening mammography   [3]   Current Outpatient Medications on File Prior to Visit   Medication Sig Dispense Refill    acetaminophen (Tylenol) 325 mg tablet Take 2 tablets (650 mg) by mouth every 4 hours if needed for mild pain (1 - 3).      aspirin 81 mg EC tablet Take 1 tablet (81 mg) by mouth once daily.      cholecalciferol (Vitamin D-3) 1.25 mg (50,000 units) tablet Take 1 tablet (1.25 mg) by mouth every 7 days.      evolocumab (Repatha SureClick) 140 mg/mL injection Inject 1 mL (140 mg) under the skin every 14 (fourteen) days. 6 each 0    levothyroxine (Synthroid, Levoxyl) 137 mcg tablet Take 1 tablet (137 mcg) by mouth once daily in the morning. Take before meals. 90 tablet 3    lisinopril 40 mg tablet Take 1 tablet (40 mg) by mouth once daily. 90 tablet 3    nicotine (Nicoderm CQ) 7 mg/24 hr patch Place 1 patch over 24 hours on the skin once every 24 hours for 15 days. 15 patch 0    rosuvastatin (Crestor) 40 mg tablet TAKE 1 TABLET BY MOUTH ONCE DAILY 90 tablet 3    valACYclovir (Valtrex) 500 mg tablet TAKE 1 TABLET BY MOUTH ONCE DAILY 90 tablet 0    varenicline tartrate (Chantix ALETHEA) 0.5 mg (11)- 1 mg (42) tablet Days 1 to 3: 0.5 mg by mouth once a day. Days 4 to 7: 0.5 mg 2 times per day. Days 8 to end of treatment: 1 mg 2 times per day as directed on package. 53 each 0     No current facility-administered medications on file prior to visit.      pharmacy team.           [1] No Known Allergies  [2]   Past Medical History:  Diagnosis Date    Acute candidiasis of vulva and vagina 11/30/2015    Vaginal yeast infection    Personal history of other diseases of the female genital tract 11/12/2021    History of postmenopausal bleeding    Personal history of other diseases of urinary system 02/16/2015    History of hematuria    Personal history of other medical treatment 10/21/2019    History of screening mammography   [3]   Current Outpatient Medications on File Prior to Visit   Medication Sig Dispense Refill    acetaminophen (Tylenol) 325 mg tablet Take 2 tablets (650 mg) by mouth every 4 hours if needed for mild pain (1 - 3).      aspirin 81 mg EC tablet Take 1 tablet (81 mg) by mouth once daily.      cholecalciferol (Vitamin D-3) 1.25 mg (50,000 units) tablet Take 1 tablet (1.25 mg) by mouth every 7 days. (Patient taking differently: Take 1 tablet (1.25 mg) by mouth every 7 days. Thursdays)      levothyroxine (Synthroid, Levoxyl) 137 mcg tablet Take 1 tablet (137 mcg) by mouth once daily in the morning. Take before meals. 90 tablet 3    lisinopril 40 mg tablet Take 1 tablet (40 mg) by mouth once daily. 90 tablet 3    rosuvastatin (Crestor) 40 mg tablet TAKE 1 TABLET BY MOUTH ONCE DAILY 90 tablet 3    valACYclovir (Valtrex) 500 mg tablet TAKE 1 TABLET BY MOUTH ONCE DAILY 90 tablet 0    varenicline tartrate (Chantix ALETHEA) 0.5 mg (11)- 1 mg (42) tablet Days 1 to 3: 0.5 mg by mouth once a day. Days 4 to 7: 0.5 mg 2 times per day. Days 8 to end of treatment: 1 mg 2 times per day as directed on package. (Patient taking differently: Take 1 mg by mouth 2 times a day. Days 1 to 3: 0.5 mg by mouth once a day. Days 4 to 7: 0.5 mg 2 times per day. Days 8 to end of treatment: 1 mg 2 times per day as directed on package.) 53 each 0    evolocumab (Repatha SureClick) 140 mg/mL injection Inject 1 mL (140 mg) under the skin every 14 (fourteen) days. (Patient not taking: Reported  on 6/18/2025) 6 each 0    nicotine (Nicoderm CQ) 7 mg/24 hr patch Place 1 patch over 24 hours on the skin once every 24 hours for 15 days. (Patient not taking: Reported on 6/18/2025) 15 patch 0     No current facility-administered medications on file prior to visit.

## 2025-06-11 ENCOUNTER — APPOINTMENT (OUTPATIENT)
Dept: RADIOLOGY | Facility: HOSPITAL | Age: 60
End: 2025-06-11
Payer: COMMERCIAL

## 2025-06-18 ENCOUNTER — APPOINTMENT (OUTPATIENT)
Dept: PHARMACY | Facility: HOSPITAL | Age: 60
End: 2025-06-18
Payer: COMMERCIAL

## 2025-06-18 DIAGNOSIS — E78.49 FAMILIAL HYPERLIPIDEMIA: ICD-10-CM

## 2025-06-18 NOTE — Clinical Note
Eddy, Spoke to Vinh today regarding starting Repatha. She has been working very hard at lifestyle modifications and is willing to start Repatha as well. She is checking her taxes because she is right around the income requirement for PAP. IF she does not qualify we will get her a copay card for $15/month. Will start medication ASAP and follow up in a month to monitor how she is doing on the medication. Thank you for the consult.

## 2025-06-18 NOTE — ASSESSMENT & PLAN NOTE
goal LDL < 100.   CONTINUE rosuvastatin 40mg every day   START Repatha 40mg subcutaneous q14d   Repeat lipid panel in 12 weeks

## 2025-06-19 DIAGNOSIS — E78.49 FAMILIAL HYPERLIPIDEMIA: Primary | ICD-10-CM

## 2025-06-19 RX ORDER — EVOLOCUMAB 140 MG/ML
140 INJECTION, SOLUTION SUBCUTANEOUS
Qty: 6 ML | Refills: 3 | Status: SHIPPED | OUTPATIENT
Start: 2025-06-19

## 2025-06-23 PROCEDURE — RXMED WILLOW AMBULATORY MEDICATION CHARGE

## 2025-06-24 ENCOUNTER — TELEPHONE (OUTPATIENT)
Dept: PHARMACY | Facility: HOSPITAL | Age: 60
End: 2025-06-24
Payer: COMMERCIAL

## 2025-06-24 DIAGNOSIS — F17.210 NICOTINE DEPENDENCE, CIGARETTES, UNCOMPLICATED: ICD-10-CM

## 2025-06-24 DIAGNOSIS — D21.9 GRANULAR CELL TUMOR: ICD-10-CM

## 2025-06-24 PROCEDURE — RXMED WILLOW AMBULATORY MEDICATION CHARGE

## 2025-06-24 RX ORDER — VARENICLINE TARTRATE 0.5 (11)-1
KIT ORAL
Qty: 53 EACH | Refills: 0 | Status: SHIPPED | OUTPATIENT
Start: 2025-06-24

## 2025-06-24 NOTE — TELEPHONE ENCOUNTER
Patient Assistance Program Approval:     We are pleased to inform you that your application for assistance has been approved.     This approval is valid through 6/23/26 as long as the following criteria continue to be satisfied:     Your medication (Repatha) remains covered under your current insurance plan.   Your prescriber does not discontinue therapy.   You do not seek reimbursement from any other private or government-funded programs for the  medication.    Under this program, the pharmacy will first bill your insurance plan for your indemnified specified medication. The [employer] Assistance Fund will then offset your copay balance, so that your out-of pocket expense for your specialty medication will be $0.00.    Alisia Bardales, PharmD

## 2025-06-25 ENCOUNTER — PHARMACY VISIT (OUTPATIENT)
Dept: PHARMACY | Facility: CLINIC | Age: 60
End: 2025-06-25
Payer: COMMERCIAL

## 2025-07-03 ENCOUNTER — APPOINTMENT (OUTPATIENT)
Dept: RADIOLOGY | Facility: HOSPITAL | Age: 60
End: 2025-07-03
Payer: COMMERCIAL

## 2025-07-03 DIAGNOSIS — Z12.31 ENCOUNTER FOR SCREENING MAMMOGRAM FOR MALIGNANT NEOPLASM OF BREAST: ICD-10-CM

## 2025-07-03 PROCEDURE — 77067 SCR MAMMO BI INCL CAD: CPT

## 2025-07-12 ENCOUNTER — OFFICE VISIT (OUTPATIENT)
Dept: URGENT CARE | Age: 60
End: 2025-07-12
Payer: COMMERCIAL

## 2025-07-12 ENCOUNTER — APPOINTMENT (OUTPATIENT)
Dept: RADIOLOGY | Facility: HOSPITAL | Age: 60
End: 2025-07-12
Payer: COMMERCIAL

## 2025-07-12 ENCOUNTER — HOSPITAL ENCOUNTER (EMERGENCY)
Facility: HOSPITAL | Age: 60
Discharge: HOME | End: 2025-07-12
Payer: COMMERCIAL

## 2025-07-12 VITALS
HEIGHT: 61 IN | TEMPERATURE: 98.2 F | RESPIRATION RATE: 18 BRPM | DIASTOLIC BLOOD PRESSURE: 68 MMHG | WEIGHT: 193 LBS | OXYGEN SATURATION: 98 % | SYSTOLIC BLOOD PRESSURE: 120 MMHG | HEART RATE: 88 BPM | BODY MASS INDEX: 36.44 KG/M2

## 2025-07-12 DIAGNOSIS — M79.89 LEFT LEG SWELLING: Primary | ICD-10-CM

## 2025-07-12 DIAGNOSIS — M79.605 LEFT LEG PAIN: ICD-10-CM

## 2025-07-12 PROCEDURE — 99284 EMERGENCY DEPT VISIT MOD MDM: CPT | Mod: 25

## 2025-07-12 PROCEDURE — 93971 EXTREMITY STUDY: CPT | Performed by: STUDENT IN AN ORGANIZED HEALTH CARE EDUCATION/TRAINING PROGRAM

## 2025-07-12 PROCEDURE — 73562 X-RAY EXAM OF KNEE 3: CPT | Mod: LT

## 2025-07-12 PROCEDURE — 73562 X-RAY EXAM OF KNEE 3: CPT | Mod: LEFT SIDE | Performed by: STUDENT IN AN ORGANIZED HEALTH CARE EDUCATION/TRAINING PROGRAM

## 2025-07-12 PROCEDURE — 93971 EXTREMITY STUDY: CPT

## 2025-07-12 ASSESSMENT — ENCOUNTER SYMPTOMS
RESPIRATORY NEGATIVE: 1
EYES NEGATIVE: 1
NEUROLOGICAL NEGATIVE: 1
CONSTITUTIONAL NEGATIVE: 1
ALLERGIC/IMMUNOLOGIC NEGATIVE: 1
HEMATOLOGIC/LYMPHATIC NEGATIVE: 1
ENDOCRINE NEGATIVE: 1
GASTROINTESTINAL NEGATIVE: 1
CARDIOVASCULAR NEGATIVE: 1
PSYCHIATRIC NEGATIVE: 1

## 2025-07-12 ASSESSMENT — PAIN SCALES - GENERAL: PAINLEVEL_OUTOF10: 4

## 2025-07-12 ASSESSMENT — PAIN - FUNCTIONAL ASSESSMENT: PAIN_FUNCTIONAL_ASSESSMENT: 0-10

## 2025-07-12 NOTE — DISCHARGE INSTRUCTIONS
May wear compression socks and elevate left leg to help with swelling.  Can take Motrin and Tylenol as needed for pain  May apply ice within first 24-48 hours and heat after that to area of pain  Please follow-up with your primary doctor within 1 week to ensure symptoms are improving  Return to ED for any new or worsening symptoms

## 2025-07-12 NOTE — ED PROVIDER NOTES
HPI   CC: Leg Swelling     Patient is a 59-year-old female with PMH hypothyroid, hypertension, hyperlipidemia presenting to the ED with concern for left leg swelling and pain.  Patient states symptoms have been present for the past month.  Pain is located in the backside of her left calf.  She is fine at rest but with walking pain flares up.  It is intermittent rated 5/10 at its worst.  She denies any injury, numbness, weakness, tingling in her leg.  She is still able to walk.  Denies any tenderness, warmth, redness.  Denies any recent travel or surgery, hormone therapy, history of DVT, history of cancer.  She takes daily aspirin.  States she was out dancing last night noticed an acute worsening of pain and swelling after that.          ROS: 10-point review of systems was performed and is otherwise negative except as noted in HPI.    Limitations to history: N/A  Independent Historians: Self   External Records Reviewed: Outpatient notes in EMR    Past Medical History: Noncontributory except per HPI     Past Surgical History: Noncontributory except per HPI     Family History: Reviewed and noncontributory     Social History:  Denies tobacco. Denies ETOH. Denies illicit drugs.    RX Allergies[1]    Home Meds:   Current Outpatient Medications   Medication Instructions    acetaminophen (TYLENOL) 650 mg, oral, Every 4 hours PRN    aspirin 81 mg, oral, Daily    cholecalciferol (VITAMIN D-3) 1,250 mcg, Every 7 days    levothyroxine (SYNTHROID, LEVOXYL) 137 mcg, oral, Daily before breakfast    lisinopril 40 mg, oral, Daily    nicotine (Nicoderm CQ) 7 mg/24 hr patch 1 patch, transdermal, Every 24 hours    Repatha SureClick 140 mg, subcutaneous, Every 14 days    rosuvastatin (Crestor) 40 mg tablet TAKE 1 TABLET BY MOUTH ONCE DAILY    valACYclovir (Valtrex) 500 mg tablet TAKE 1 TABLET BY MOUTH ONCE DAILY    varenicline tartrate (Chantix ALETHEA) 0.5 mg (11)- 1 mg (42) tablet Days 1 to 3: 0.5 mg by mouth once a day. Days 4 to 7: 0.5  mg 2 times per day. Days 8 to end of treatment: 1 mg 2 times per day as directed on package.        Physical Exam     ED Triage Vitals [07/12/25 1248]   Temperature Heart Rate Respirations BP   36.8 °C (98.2 °F) 97 18 121/72      Pulse Ox Temp Source Heart Rate Source Patient Position   97 % Temporal Monitor --      BP Location FiO2 (%)     Right arm --         Vitals and nursing note reviewed.   Physical Exam  Constitutional:       General: She is not in acute distress.     Appearance: Normal appearance. She is not ill-appearing, toxic-appearing or diaphoretic.   HENT:      Head: Normocephalic and atraumatic.      Mouth/Throat:      Mouth: Mucous membranes are moist.      Pharynx: Oropharynx is clear. No oropharyngeal exudate or posterior oropharyngeal erythema.   Eyes:      General: No scleral icterus.        Right eye: No discharge.         Left eye: No discharge.      Extraocular Movements: Extraocular movements intact.      Conjunctiva/sclera: Conjunctivae normal.      Pupils: Pupils are equal, round, and reactive to light.   Cardiovascular:      Rate and Rhythm: Normal rate and regular rhythm.      Heart sounds: Murmur heard.      No friction rub. No gallop.      Comments: Systolic murmur    DP and PT 2+ b/l  Pulmonary:      Effort: Pulmonary effort is normal. No respiratory distress.      Breath sounds: Normal breath sounds. No stridor. No wheezing, rhonchi or rales.   Abdominal:      General: Abdomen is flat. Bowel sounds are normal. There is no distension.      Palpations: Abdomen is soft.      Tenderness: There is no abdominal tenderness. There is no right CVA tenderness, left CVA tenderness, guarding or rebound.   Musculoskeletal:         General: Normal range of motion.      Cervical back: Normal range of motion and neck supple. No rigidity or tenderness.      Comments: There is no joint deformity, crepitus, bruising, swelling, erythema, or warmth. Compartments are soft. ROM full to bilateral hip  flexion/extension/adduction/abduction/internal rotation/external rotation, knee flexion/extension, ankle dorsi/plantarflexion/inversion/eversion.    Tenderness to palpation inferior aspect of left patella.  No tenderness of patient of left calf.  Left calf circumference 39 cm.  Right calf circumference 38 cm.  Negative Homans' sign bilaterally   Lymphadenopathy:      Cervical: No cervical adenopathy.   Skin:     General: Skin is warm and dry.      Capillary Refill: Capillary refill takes less than 2 seconds.   Neurological:      General: No focal deficit present.      Mental Status: She is alert and oriented to person, place, and time.      Comments: Ankle dorsi/plantarflexion 5/5 B/L.  Sensation intact to light touch in bilateral lower extremities   Psychiatric:         Mood and Affect: Mood normal.         Behavior: Behavior normal.         Diagnostic Results      Labs Reviewed - No data to display      XR knee left 3 views   Final Result   No acute fracture or malalignment.             Signed by: Cesar Gonzalez 7/12/2025 2:19 PM   Dictation workstation:   [id number]      Lower extremity venous duplex left   Final Result   Negative study. No deep venous thrombosis of the left lower extremity.        MACRO:   None        Signed by: Cesar Gonzalez 7/12/2025 2:18 PM   Dictation workstation:   [id number]          ED Course & MDM   Assessment/Plan:   Medications - No data to display   ED Course as of 07/12/25 1438   Sat Jul 12, 2025   1435 Patient is a 59-year-old female presenting to the ED with concern for left leg pain and swelling.  Vital signs are stable, patient is nontoxic-appearing.  Patient is neurovascularly intact in the left lower extremity with good pulses and soft compartments.  There is no overlying joint redness or warmth.  No rashes.  Lower suspicion for acute arterial occlusion, compartment syndrome, septic joint, crystalline arthropathy, cellulitis.  There is no calf tenderness, however patient  does have tenderness to the inferior aspect of left patella.  X-ray left knee with no acute abnormalities.  DVT ultrasound negative for DVT and Baker's cyst.  Patient is appropriate for outpatient management.  Educated patient to wear compression socks and elevate foot.  Can take Motrin and Tylenol as needed for pain.  Offered to send prescriptions, however patient states she has these at home.  Patient has a PCP she can follow-up with.  Recommend follow-up within 1 week to ensure symptoms are improving.  Given acute worsening of pain and swelling after activity last night suspect potential muscle strain.  Educated patient she can apply ice to area within first 1-2 days and then heat after that.  Patient understands and is agreeable with plan. Patient told to return to ED for any new or worsening symptoms. [VS]   1438 Systolic murmur was heard on physical exam.  Patient states that this is a known condition for her that is not new.  She follows with her PCP who is monitoring. [VS]      ED Course User Index  [VS] Tru Tineo PA-C         Diagnoses as of 07/12/25 1438   Left leg swelling   Left leg pain       ED Prescriptions    None         Chronic Medical Conditions Significantly Affecting Care:      Escalation of Care: Appropriate for outpatient management    Counseling: Spoke with the patient and discussed today´s findings, in addition to providing specific details for the plan of care and expected course.  Patient was given the opportunity to ask questions.    Discussed return precautions and importance of follow-up.  Advised to follow-up with PCP.  Advised to return to the ED for changing or worsening symptoms, new symptoms, complaint specific precautions, and precautions listed on the discharge paperwork.    I advised the patient that the emergency evaluation and treatment provided today doesn't end their need for medical care. It is very important that they follow-up with their primary care  provider or other specialist as instructed.    The plan of care was mutually agreed upon with the patient. The patient and/or family were given the opportunity to ask questions. All questions asked today in the ED were answered to the best of my ability with today's information.    I specifically advised the patient to return to the ED for changing or worsening symptoms, worrisome new symptoms, or for any complaint specific precautions listed on the discharge paperwork.    Procedure  Procedures         [1] No Known Allergies       Tru Tineo PA-C  07/12/25 3260

## 2025-07-12 NOTE — PROGRESS NOTES
"Subjective   Patient ID: Vinh Haq \"VINH URIARTE" is a 59 y.o. female. They present today with a chief complaint of No chief complaint on file..    History of Present Illness    History provided by:  Patient   used: No    Other  Location:  Left leg swelling X one day risk factor cigarette smoking and age  Severity:  Mild  Onset quality:  Sudden  Duration:  1 day  Progression:  Worsening  Chronicity:  New      Past Medical History  Allergies as of 07/12/2025    (No Known Allergies)       Prescriptions Prior to Admission[1]     Medical History[2]    Surgical History[3]     reports that she has been smoking cigarettes. She has a 7.5 pack-year smoking history. She has never used smokeless tobacco. She reports current alcohol use of about 2.0 standard drinks of alcohol per week. She reports that she does not use drugs.    Review of Systems  Review of Systems   Constitutional: Negative.    HENT: Negative.     Eyes: Negative.    Respiratory: Negative.     Cardiovascular: Negative.    Gastrointestinal: Negative.    Endocrine: Negative.    Genitourinary: Negative.    Allergic/Immunologic: Negative.    Neurological: Negative.    Hematological: Negative.    Psychiatric/Behavioral: Negative.     All other systems reviewed and are negative.                                 Objective    There were no vitals filed for this visit.  Patient's last menstrual period was 11/03/2021.    Physical Exam  Vitals reviewed.   Constitutional:       Appearance: Normal appearance. She is normal weight.   Cardiovascular:      Rate and Rhythm: Normal rate.      Pulses: Normal pulses.      Heart sounds: Normal heart sounds.   Pulmonary:      Effort: Pulmonary effort is normal.   Abdominal:      General: Bowel sounds are normal.      Palpations: Abdomen is soft.   Musculoskeletal:         General: Swelling present.        Legs:    Neurological:      General: No focal deficit present.      Mental Status: She is alert and oriented " to person, place, and time. Mental status is at baseline.         Procedures    Point of Care Test & Imaging Results from this visit  No results found for this visit on 07/12/25.   Imaging  XR knee left 3 views  Result Date: 7/12/2025  No acute fracture or malalignment.     Signed by: Cesar Gonzalez 7/12/2025 2:19 PM Dictation workstation:   [id number]    Lower extremity venous duplex left  Result Date: 7/12/2025  Negative study. No deep venous thrombosis of the left lower extremity.   MACRO: None   Signed by: Cesar Gonzalez 7/12/2025 2:18 PM Dictation workstation:   [id number]      Cardiology, Vascular, and Other Imaging  No other imaging results found for the past 2 days      Diagnostic study results (if any) were reviewed by LANDY Kenney.    Assessment/Plan   Allergies, medications, history, and pertinent labs/EKGs/Imaging reviewed by LANDY Kenney.     Medical Decision Making  Sent to ED for further eval of left lower ext with risk factors age and cigarette smokign    Orders and Diagnoses  Diagnoses and all orders for this visit:  Left leg swelling      Medical Admin Record      Patient disposition: Home    Electronically signed by LANDY Kenney  6:45 PM           [1] (Not in a hospital admission)  [2]   Past Medical History:  Diagnosis Date    Acute candidiasis of vulva and vagina 11/30/2015    Vaginal yeast infection    Heart murmur     Hypertension     Personal history of other diseases of the female genital tract 11/12/2021    History of postmenopausal bleeding    Personal history of other diseases of urinary system 02/16/2015    History of hematuria    Personal history of other medical treatment 10/21/2019    History of screening mammography   [3]   Past Surgical History:  Procedure Laterality Date    COLONOSCOPY  07/11/2017    Complete Colonoscopy    COLONOSCOPY  11/2024    rpt 11-34    OTHER SURGICAL HISTORY  02/16/2015    Biopsy Thyroid Using Percutaneous Core  Needle    OTHER SURGICAL HISTORY  07/15/2020    Thyroidectomy total

## 2025-07-14 NOTE — PROGRESS NOTES
"  Pharmacist Clinic: Cardiology Management    Vinh Haq \"VINH SOSA\" is a 59 y.o. female was referred to Clinical Pharmacy Team for choolesterol management.     Referring Provider: Ayde Nath MD    THIS IS A FOLLOW UP PATIENT APPOINTMENT. AT LAST VISIT ON 6/18/25 WITH PHARMACIST (Alisia Bardales).    Appointment was completed by Vinh who was reached at primary number.    REVIEW OF PAST APPNT (IF APPLICABLE):   Last visit was an initial visit to establish with clinical pharmacy. Patient medications and allergies were reviewed and updated.  Patient's LDL is 203 on rosuvastatin 40mg daily. Patient is working very hard at lifestyle modifications. Trying to stop smoking, dieting, and exercising.   Plan to start Repatha as well.   Since last visit patient has been approved for  PAP till 6/23/26.     Allergies Reviewed? No    Allergies[1]    Medical History[2]    Medications Ordered Prior to Encounter[3]      RELEVANT LAB RESULTS:  Lab Results   Component Value Date    BILITOT 0.4 05/14/2025    CALCIUM 9.8 05/14/2025    CO2 24 05/14/2025     05/14/2025    CREATININE 0.89 05/14/2025    GLUCOSE 101 (H) 05/14/2025    ALKPHOS 68 05/14/2025    K 4.5 05/14/2025    PROT 7.5 05/14/2025     05/14/2025    AST 21 05/14/2025    ALT 32 (H) 05/14/2025    BUN 22 05/14/2025    ANIONGAP 9 05/14/2025    PHOS 3.5 06/07/2023    ALBUMIN 4.7 05/14/2025    LIPASE 31 11/15/2024    GFRF 81 06/07/2023     Lab Results   Component Value Date    TRIG 195 (H) 05/14/2025    CHOL 288 (H) 05/14/2025    LDLCALC 203 (H) 05/14/2025    HDL 48 (L) 05/14/2025     No results found for: \"BMCBC\", \"CBCDIF\"     PHARMACEUTICAL ASSESSMENT:    MEDICATION RECONCILIATION    Was a medication reconciliation completed at this visit? No  Home Pharmacy Reviewed? Yes, describe: [facility]     Drug Interactions? No    Medication Documentation Review Audit       Reviewed by Alisia Bardales, PharmD (Pharmacist) on 06/18/25 at 0921      Medication Order " Taking? Sig Documenting Provider Last Dose Status   acetaminophen (Tylenol) 325 mg tablet [id number] Yes Take 2 tablets (650 mg) by mouth every 4 hours if needed for mild pain (1 - 3). LANDY Cody  Active   aspirin 81 mg EC tablet [id number] Yes Take 1 tablet (81 mg) by mouth once daily. LANDY Cody  Active   cholecalciferol (Vitamin D-3) 1.25 mg (50,000 units) tablet [id number] Yes Take 1 tablet (1.25 mg) by mouth every 7 days.   Patient taking differently: Take 1 tablet (1.25 mg) by mouth every 7 days.     Historical Provider, MD  Active   evolocumab (Repatha SureClick) 140 mg/mL injection [id number]  Inject 1 mL (140 mg) under the skin every 14 (fourteen) days.   Patient not taking: Reported on 2025    Ayde Nath MD  Active   levothyroxine (Synthroid, Levoxyl) 137 mcg tablet [id number] Yes Take 1 tablet (137 mcg) by mouth once daily in the morning. Take before meals. Sobia Smith MD  Active   lisinopril 40 mg tablet [id number] Yes Take 1 tablet (40 mg) by mouth once daily. Fran Gonzales MD  Active   nicotine (Nicoderm CQ) 7 mg/24 hr patch [id number]  Place 1 patch over 24 hours on the skin once every 24 hours for 15 days.   Patient not taking: Reported on 2025    LANDY Cody   24 2359   rosuvastatin (Crestor) 40 mg tablet [id number] Yes TAKE 1 TABLET BY MOUTH ONCE DAILY Fran Gonzales MD  Active   valACYclovir (Valtrex) 500 mg tablet [id number] Yes TAKE 1 TABLET BY MOUTH ONCE DAILY LANDY Alcaraz  Active   varenicline tartrate (Chantix ALETHEA) 0.5 mg (11)- 1 mg (42) tablet [id number] Yes Days 1 to 3: 0.5 mg by mouth once a day. Days 4 to 7: 0.5 mg 2 times per day. Days 8 to end of treatment: 1 mg 2 times per day as directed on package.   Patient taking differently: Take 1 mg by mouth 2 times a day. Days 1 to 3: 0.5 mg by mouth once a day. Days 4 to 7: 0.5 mg 2 times per day. Days 8 to end of treatment: 1 mg 2 times per  day as directed on package.    Fran Camargo MD MPH  Active                    DISEASE MANAGEMENT ASSESSMENT:     HYPERCHOLESTEROLEMIA ASSESSMENT     RECENT LIPID PANEL (DATE): 5/14/25        Lab Results   Component Value Date     TRIG 195 (H) 05/14/2025     CHOL 288 (H) 05/14/2025     LDLCALC 203 (H) 05/14/2025     HDL 48 (L) 05/14/2025                       ASCVD SCORE: The 10-year ASCVD risk score (Valerio HARVEY, et al., 2019) is: 22.4%    Values used to calculate the score:      Age: 59 years      Sex: Female      Is Non- : Yes      Diabetic: No      Tobacco smoker: Yes      Systolic Blood Pressure: 133 mmHg      Is BP treated: Yes      HDL Cholesterol: 48 mg/dL      Total Cholesterol: 288 mg/dL  Coronary Heart Disease (MI, angina, coronary artery stenosis): Yes              History of ischemic stroke? No              History of carotid artery stenosis? No              Peripheral artery disease? No   ASCVD RISK FACTORS:               CKD? No              Diabetes? No              HTN? Yes              Persistently elevated LDL? Yes              Elevated triglycerides? Yes              Inflammatory diseases (rheumatoid arthritis, psoriasis, HIV)? No     CURRENT PHARMACOTHERAPY  - Statin? Yes, describe: rosuvastatin 40mg every day   - Ezetimibe? No  - PCSK9-I? No  - Other lipid lowering agents? No        RELEVANT PAST MEDICAL HISTORY:   CAD, familial HLD, HTN, nicotine dependence      ASSESSMENT     Affordability/Accessibility:  PAP   Adherence/Organization: reports adherence, takes injection on Fridays   Adverse Effects: none reported   Recent Hospitalizations: No   Diet:   2 meals   Lunch: salad with chicken 3x per week other days she eats whatever in the fridge   Dinner: chicken, fish  Snacks: not often  Dessert: no often   No pop   Exercise: Yes, describe: walks 3-5 times per week and bikes   Tobacco Use: Yes, describe: 1 pack cigarette per week. On chantix to hopefully stop all  together   Alcohol Use: No  Next Lipid Panel: 8/31/25 +     EDUCATION/COUNSELING:  - Counseled patient on MOA, expectations, side effects, duration of therapy, contraindications, administration, and monitoring parameters  - Answered all patient questions and concerns    DISCUSSION/NOTES:   Since last visit patient has been approved for PAP till 6/23/26 for Repatha. She has filled the medication and has started taking it.   She denies any side effects from the medication and has no complaints.  Continue medication and repeat lipid panel in 2 months, follow up after lab draw to review results.     ASSESSMENT:    Assessment/Plan   Problem List Items Addressed This Visit    None        RECOMMENDATIONS/PLAN:    CONTINUE  Repatha 140mg subcutaneous q14ds   Rosuvastatin 40mg every day  Repeat lipid panel in 2 months  Follow up in 2 months     Last Appnt with Referring Provider: 5/31/25  Next Appnt with Referring Provider: 10/16/25  Clinical Pharmacist follow up: 9/3/25  VAF/Application Expiration: Yes    Date: 6/23/26  Type of Encounter: Fitz ColvinD    Verbal consent to manage patient's drug therapy was obtained from the patient . They were informed they may decline to participate or withdraw from participation in pharmacy services at any time.    Continue all meds under the continuation of care with the referring provider and clinical pharmacy team.           [1] No Known Allergies  [2]   Past Medical History:  Diagnosis Date    Acute candidiasis of vulva and vagina 11/30/2015    Vaginal yeast infection    Heart murmur     Hypertension     Personal history of other diseases of the female genital tract 11/12/2021    History of postmenopausal bleeding    Personal history of other diseases of urinary system 02/16/2015    History of hematuria    Personal history of other medical treatment 10/21/2019    History of screening mammography   [3]   Current Outpatient Medications on File Prior to Visit    Medication Sig Dispense Refill    acetaminophen (Tylenol) 325 mg tablet Take 2 tablets (650 mg) by mouth every 4 hours if needed for mild pain (1 - 3).      aspirin 81 mg EC tablet Take 1 tablet (81 mg) by mouth once daily.      cholecalciferol (Vitamin D-3) 1.25 mg (50,000 units) tablet Take 1 tablet (1.25 mg) by mouth every 7 days. (Patient taking differently: Take 1 tablet (1.25 mg) by mouth every 7 days. Thursdays)      evolocumab (Repatha SureClick) 140 mg/mL injection Inject 1 mL (140 mg) under the skin every 14 (fourteen) days. 6 mL 3    levothyroxine (Synthroid, Levoxyl) 137 mcg tablet Take 1 tablet (137 mcg) by mouth once daily in the morning. Take before meals. 90 tablet 3    lisinopril 40 mg tablet Take 1 tablet (40 mg) by mouth once daily. 90 tablet 3    nicotine (Nicoderm CQ) 7 mg/24 hr patch Place 1 patch over 24 hours on the skin once every 24 hours for 15 days. (Patient not taking: Reported on 6/18/2025) 15 patch 0    rosuvastatin (Crestor) 40 mg tablet TAKE 1 TABLET BY MOUTH ONCE DAILY 90 tablet 3    valACYclovir (Valtrex) 500 mg tablet TAKE 1 TABLET BY MOUTH ONCE DAILY 90 tablet 0    varenicline tartrate (Chantix ALETHEA) 0.5 mg (11)- 1 mg (42) tablet Days 1 to 3: 0.5 mg by mouth once a day. Days 4 to 7: 0.5 mg 2 times per day. Days 8 to end of treatment: 1 mg 2 times per day as directed on package. 53 each 0     No current facility-administered medications on file prior to visit.

## 2025-07-16 ENCOUNTER — APPOINTMENT (OUTPATIENT)
Dept: PHARMACY | Facility: HOSPITAL | Age: 60
End: 2025-07-16
Payer: COMMERCIAL

## 2025-07-16 DIAGNOSIS — E78.49 FAMILIAL HYPERLIPIDEMIA: Primary | ICD-10-CM

## 2025-07-16 NOTE — ASSESSMENT & PLAN NOTE
goal LDL < 100.   CONTINUE   --Rosuvastatin 40mg every day   --Repatha 40mg subcutaneous q14d   Repeat lipid panel in 12 weeks

## 2025-07-16 NOTE — Clinical Note
Hello, · Since last visit patient has been approved for PAP till 6/23/26 for Repatha. She has filled the medication and has started taking it.  · She denies any side effects from the medication and has no complaints. · Continue medication and repeat lipid panel in 2 months, follow up after lab draw to review results.

## 2025-07-18 DIAGNOSIS — D21.9 GRANULAR CELL TUMOR: ICD-10-CM

## 2025-07-18 DIAGNOSIS — F17.210 NICOTINE DEPENDENCE, CIGARETTES, UNCOMPLICATED: ICD-10-CM

## 2025-07-21 PROCEDURE — RXMED WILLOW AMBULATORY MEDICATION CHARGE

## 2025-07-21 RX ORDER — VARENICLINE TARTRATE 0.5 (11)-1
KIT ORAL
Qty: 53 EACH | Refills: 0 | Status: SHIPPED | OUTPATIENT
Start: 2025-07-21

## 2025-07-23 ENCOUNTER — PHARMACY VISIT (OUTPATIENT)
Dept: PHARMACY | Facility: CLINIC | Age: 60
End: 2025-07-23
Payer: COMMERCIAL

## 2025-08-12 DIAGNOSIS — E03.9 HYPOTHYROIDISM, UNSPECIFIED TYPE: ICD-10-CM

## 2025-08-12 PROCEDURE — RXMED WILLOW AMBULATORY MEDICATION CHARGE

## 2025-08-12 RX ORDER — LEVOTHYROXINE SODIUM 137 UG/1
137 TABLET ORAL
Qty: 90 TABLET | Refills: 1 | Status: SHIPPED | OUTPATIENT
Start: 2025-08-12 | End: 2026-08-12

## 2025-08-13 ENCOUNTER — PHARMACY VISIT (OUTPATIENT)
Dept: PHARMACY | Facility: CLINIC | Age: 60
End: 2025-08-13
Payer: COMMERCIAL

## 2025-08-15 ENCOUNTER — PATIENT MESSAGE (OUTPATIENT)
Dept: OBSTETRICS AND GYNECOLOGY | Facility: HOSPITAL | Age: 60
End: 2025-08-15
Payer: COMMERCIAL

## 2025-08-15 DIAGNOSIS — Z00.00 HEALTHCARE MAINTENANCE: ICD-10-CM

## 2025-08-15 RX ORDER — VALACYCLOVIR HYDROCHLORIDE 500 MG/1
500 TABLET, FILM COATED ORAL DAILY
Qty: 90 TABLET | Refills: 0 | OUTPATIENT
Start: 2025-08-15 | End: 2026-08-15

## 2025-08-21 ENCOUNTER — PHARMACY VISIT (OUTPATIENT)
Dept: PHARMACY | Facility: CLINIC | Age: 60
End: 2025-08-21
Payer: COMMERCIAL

## 2025-08-21 PROCEDURE — RXMED WILLOW AMBULATORY MEDICATION CHARGE

## 2025-08-27 ENCOUNTER — PHARMACY VISIT (OUTPATIENT)
Dept: PHARMACY | Facility: CLINIC | Age: 60
End: 2025-08-27
Payer: COMMERCIAL

## 2025-08-27 DIAGNOSIS — E78.49 FAMILIAL HYPERLIPIDEMIA: ICD-10-CM

## 2025-08-27 DIAGNOSIS — I10 HYPERTENSION, UNSPECIFIED TYPE: ICD-10-CM

## 2025-08-27 PROCEDURE — RXMED WILLOW AMBULATORY MEDICATION CHARGE

## 2025-08-27 RX ORDER — VALACYCLOVIR HYDROCHLORIDE 500 MG/1
500 TABLET, FILM COATED ORAL DAILY
Qty: 90 TABLET | Refills: 2 | Status: SHIPPED | OUTPATIENT
Start: 2025-08-27 | End: 2025-11-25

## 2025-08-27 RX ORDER — LISINOPRIL 40 MG/1
40 TABLET ORAL DAILY
Qty: 90 TABLET | Refills: 3 | Status: SHIPPED | OUTPATIENT
Start: 2025-08-27 | End: 2025-11-25

## 2025-08-28 ENCOUNTER — PHARMACY VISIT (OUTPATIENT)
Dept: PHARMACY | Facility: CLINIC | Age: 60
End: 2025-08-28
Payer: COMMERCIAL

## 2025-08-28 PROCEDURE — RXMED WILLOW AMBULATORY MEDICATION CHARGE

## 2025-08-29 LAB
CHOLEST SERPL-MCNC: 138 MG/DL
CHOLEST/HDLC SERPL: 3.5 (CALC)
HDLC SERPL-MCNC: 39 MG/DL
LDLC SERPL CALC-MCNC: 77 MG/DL (CALC)
NONHDLC SERPL-MCNC: 99 MG/DL (CALC)
TRIGL SERPL-MCNC: 140 MG/DL

## 2025-09-03 ENCOUNTER — APPOINTMENT (OUTPATIENT)
Dept: PHARMACY | Facility: HOSPITAL | Age: 60
End: 2025-09-03
Payer: COMMERCIAL

## 2025-10-13 ENCOUNTER — APPOINTMENT (OUTPATIENT)
Dept: PRIMARY CARE | Facility: CLINIC | Age: 60
End: 2025-10-13
Payer: COMMERCIAL

## 2025-10-16 ENCOUNTER — APPOINTMENT (OUTPATIENT)
Dept: CARDIOLOGY | Facility: HOSPITAL | Age: 60
End: 2025-10-16
Payer: COMMERCIAL

## 2025-12-03 ENCOUNTER — APPOINTMENT (OUTPATIENT)
Dept: ENDOCRINOLOGY | Facility: CLINIC | Age: 60
End: 2025-12-03
Payer: COMMERCIAL

## 2026-02-09 ENCOUNTER — APPOINTMENT (OUTPATIENT)
Dept: ENDOCRINOLOGY | Facility: CLINIC | Age: 61
End: 2026-02-09
Payer: COMMERCIAL

## 2026-02-11 ENCOUNTER — APPOINTMENT (OUTPATIENT)
Dept: ENDOCRINOLOGY | Facility: CLINIC | Age: 61
End: 2026-02-11
Payer: COMMERCIAL

## 2026-03-11 ENCOUNTER — APPOINTMENT (OUTPATIENT)
Dept: OPHTHALMOLOGY | Facility: CLINIC | Age: 61
End: 2026-03-11
Payer: COMMERCIAL

## 2026-05-20 ENCOUNTER — APPOINTMENT (OUTPATIENT)
Dept: PHARMACY | Facility: HOSPITAL | Age: 61
End: 2026-05-20
Payer: COMMERCIAL